# Patient Record
Sex: FEMALE | Race: WHITE | Employment: OTHER | URBAN - NONMETROPOLITAN AREA
[De-identification: names, ages, dates, MRNs, and addresses within clinical notes are randomized per-mention and may not be internally consistent; named-entity substitution may affect disease eponyms.]

---

## 2017-12-19 ENCOUNTER — OFFICE VISIT (OUTPATIENT)
Dept: SURGERY | Age: 77
End: 2017-12-19
Payer: MEDICARE

## 2017-12-19 VITALS
WEIGHT: 146 LBS | SYSTOLIC BLOOD PRESSURE: 124 MMHG | HEART RATE: 50 BPM | DIASTOLIC BLOOD PRESSURE: 72 MMHG | BODY MASS INDEX: 23.46 KG/M2 | HEIGHT: 66 IN

## 2017-12-19 DIAGNOSIS — N63.21 MASS OF UPPER OUTER QUADRANT OF LEFT BREAST: Primary | ICD-10-CM

## 2017-12-19 PROCEDURE — G8400 PT W/DXA NO RESULTS DOC: HCPCS | Performed by: PHYSICIAN ASSISTANT

## 2017-12-19 PROCEDURE — G8427 DOCREV CUR MEDS BY ELIG CLIN: HCPCS | Performed by: PHYSICIAN ASSISTANT

## 2017-12-19 PROCEDURE — 99202 OFFICE O/P NEW SF 15 MIN: CPT | Performed by: PHYSICIAN ASSISTANT

## 2017-12-19 PROCEDURE — 4040F PNEUMOC VAC/ADMIN/RCVD: CPT | Performed by: PHYSICIAN ASSISTANT

## 2017-12-19 PROCEDURE — 1123F ACP DISCUSS/DSCN MKR DOCD: CPT | Performed by: PHYSICIAN ASSISTANT

## 2017-12-19 PROCEDURE — G8420 CALC BMI NORM PARAMETERS: HCPCS | Performed by: PHYSICIAN ASSISTANT

## 2017-12-19 PROCEDURE — G8484 FLU IMMUNIZE NO ADMIN: HCPCS | Performed by: PHYSICIAN ASSISTANT

## 2017-12-19 PROCEDURE — 1036F TOBACCO NON-USER: CPT | Performed by: PHYSICIAN ASSISTANT

## 2017-12-19 PROCEDURE — 1090F PRES/ABSN URINE INCON ASSESS: CPT | Performed by: PHYSICIAN ASSISTANT

## 2017-12-19 RX ORDER — PANTOPRAZOLE SODIUM 40 MG/1
40 TABLET, DELAYED RELEASE ORAL DAILY
COMMUNITY
Start: 2017-12-01

## 2017-12-19 RX ORDER — ATORVASTATIN CALCIUM 80 MG/1
80 TABLET, FILM COATED ORAL DAILY
COMMUNITY
Start: 2017-12-01

## 2017-12-19 RX ORDER — POTASSIUM CHLORIDE 750 MG/1
10 TABLET, FILM COATED, EXTENDED RELEASE ORAL DAILY
COMMUNITY
Start: 2017-12-01

## 2017-12-19 RX ORDER — SPIRONOLACTONE AND HYDROCHLOROTHIAZIDE 25; 25 MG/1; MG/1
1 TABLET, FILM COATED ORAL DAILY
COMMUNITY
Start: 2017-12-01

## 2017-12-19 RX ORDER — ASPIRIN 81 MG/1
81 TABLET ORAL DAILY
COMMUNITY
Start: 2017-12-01

## 2017-12-22 NOTE — PROGRESS NOTES
Subjective:      Patient ID: Martha Cruz is a 69 y.o. female. HPI  Ms. Clint Billy presents with an abnormal left mammogram demonstrating a small spiculated mass. US was normal.   This was her first mammogram in 5 years. Martha Cruz is a 69 y.o. female with the following history as recorded in EpicCare: There are no active problems to display for this patient. Current Outpatient Prescriptions   Medication Sig Dispense Refill    ASPIR-LOW 81 MG EC tablet       ALDACTAZIDE 25-25 MG per tablet       atorvastatin (LIPITOR) 80 MG tablet       metoprolol tartrate (LOPRESSOR) 25 MG tablet       pantoprazole (PROTONIX) 40 MG tablet       calcium carbonate-vitamin D (CALTRATE) 600-400 MG-UNIT TABS per tab       KLOR-CON 10 10 MEQ extended release tablet        No current facility-administered medications for this visit. Allergies: Review of patient's allergies indicates no known allergies. Past Medical History:   Diagnosis Date    Stroke [facility])      Past Surgical History:   Procedure Laterality Date     SECTION      COLONOSCOPY      CYST REMOVAL Left     Left breast      Family History   Problem Relation Age of Onset    Lung Cancer Father      Social History   Substance Use Topics    Smoking status: Passive Smoke Exposure - Never Smoker    Smokeless tobacco: Never Used    Alcohol use No       Review of Systems   All other systems reviewed and are negative. Objective:   Physical Exam   Constitutional: She is oriented to person, place, and time. She appears well-developed and well-nourished. No distress. HENT:   Head: Normocephalic and atraumatic. Right Ear: External ear normal.   Left Ear: External ear normal.   Eyes: Conjunctivae and EOM are normal. Pupils are equal, round, and reactive to light. Pulmonary/Chest: Right breast exhibits no inverted nipple, no mass, no nipple discharge, no skin change and no tenderness.  Left breast exhibits no inverted nipple, no mass, no nipple discharge, no skin change and no tenderness. Breasts are symmetrical.   Neurological: She is alert and oriented to person, place, and time. No cranial nerve deficit. Skin: Skin is warm and dry. No rash noted. She is not diaphoretic. No erythema. No pallor. Psychiatric: She has a normal mood and affect. Her behavior is normal. Judgment and thought content normal.       Assessment:      Left breast mass      Plan:      PLAN:  This will require stereotactic guided mammotome biopsy, which will be done in the hospital under local anesthesia. Further procedures will be done as indicated. CONSENT:  The risks, benefits and options of stereotactic biopsy were discussed with her including but not limited to bleeding, infection, hematoma, missing the lesion, and scarring. She expresses good understanding and is agreeable to proceed.

## 2018-01-03 ENCOUNTER — HOSPITAL ENCOUNTER (OUTPATIENT)
Dept: WOMENS IMAGING | Age: 78
Discharge: HOME | End: 2018-01-03
Payer: MEDICARE

## 2018-01-03 DIAGNOSIS — N63.20 LEFT BREAST MASS: ICD-10-CM

## 2018-01-03 DIAGNOSIS — N63.21 MASS OF UPPER OUTER QUADRANT OF LEFT BREAST: ICD-10-CM

## 2018-01-03 PROCEDURE — 88341 IMHCHEM/IMCYTCHM EA ADD ANTB: CPT

## 2018-01-03 PROCEDURE — 2720000001 US BREAST NEEDLE BIOPSY LEFT

## 2018-01-03 PROCEDURE — 88342 IMHCHEM/IMCYTCHM 1ST ANTB: CPT

## 2018-01-03 PROCEDURE — 88374 M/PHMTRC ALYS ISHQUANT/SEMIQ: CPT

## 2018-01-03 PROCEDURE — 88361 TUMOR IMMUNOHISTOCHEM/COMPUT: CPT

## 2018-01-03 PROCEDURE — 88360 TUMOR IMMUNOHISTOCHEM/MANUAL: CPT

## 2018-01-03 PROCEDURE — 2720000001 US BREAST BIOPSY NEEDLE ADDITIONAL LEFT

## 2018-01-03 PROCEDURE — 88305 TISSUE EXAM BY PATHOLOGIST: CPT

## 2018-01-03 PROCEDURE — 77065 DX MAMMO INCL CAD UNI: CPT

## 2018-01-03 PROCEDURE — 76642 ULTRASOUND BREAST LIMITED: CPT

## 2018-01-05 ENCOUNTER — LAB REQUISITION (OUTPATIENT)
Dept: LAB | Facility: HOSPITAL | Age: 78
End: 2018-01-05

## 2018-01-05 DIAGNOSIS — Z00.00 ROUTINE GENERAL MEDICAL EXAMINATION AT A HEALTH CARE FACILITY: ICD-10-CM

## 2018-01-05 PROCEDURE — 88341 IMHCHEM/IMCYTCHM EA ADD ANTB: CPT

## 2018-01-05 PROCEDURE — 88342 IMHCHEM/IMCYTCHM 1ST ANTB: CPT

## 2018-01-08 ENCOUNTER — TELEPHONE (OUTPATIENT)
Dept: SURGERY | Age: 78
End: 2018-01-08

## 2018-01-08 DIAGNOSIS — C50.112 MALIGNANT NEOPLASM OF CENTRAL PORTION OF LEFT FEMALE BREAST, UNSPECIFIED ESTROGEN RECEPTOR STATUS (HCC): Primary | ICD-10-CM

## 2018-01-09 PROBLEM — C50.112 MALIGNANT NEOPLASM OF CENTRAL PORTION OF LEFT FEMALE BREAST (HCC): Status: ACTIVE | Noted: 2018-01-09

## 2018-01-09 RX ORDER — DIAZEPAM 5 MG/1
TABLET ORAL
Qty: 4 TABLET | Refills: 0 | OUTPATIENT
Start: 2018-01-09 | End: 2018-01-30

## 2018-01-10 DIAGNOSIS — C50.112 MALIGNANT NEOPLASM OF CENTRAL PORTION OF LEFT FEMALE BREAST, UNSPECIFIED ESTROGEN RECEPTOR STATUS (HCC): Primary | ICD-10-CM

## 2018-01-10 NOTE — TELEPHONE ENCOUNTER
[facility]  [facility]   [email or web address] on 1/18/2018@12 Pm  Left Breast [email or web address] on 1/24@3 and then she will have her consult [email or web address] Pm. All instructions for test were given.      Appt W/Elaine has been canceled

## 2018-01-18 ENCOUNTER — HOSPITAL ENCOUNTER (OUTPATIENT)
Dept: MRI IMAGING | Age: 78
Discharge: HOME | End: 2018-01-18
Payer: MEDICARE

## 2018-01-18 DIAGNOSIS — C50.112 MALIGNANT NEOPLASM OF CENTRAL PORTION OF LEFT FEMALE BREAST, UNSPECIFIED ESTROGEN RECEPTOR STATUS (HCC): ICD-10-CM

## 2018-01-18 LAB
GFR NON-AFRICAN AMERICAN: >60
PERFORMED ON: NORMAL
POC CREATININE: 0.8 MG/DL (ref 0.3–1.3)
POC SAMPLE TYPE: NORMAL

## 2018-01-18 PROCEDURE — C8908 MRI W/O FOL W/CONT, BREAST,: HCPCS

## 2018-01-18 PROCEDURE — A9577 INJ MULTIHANCE: HCPCS | Performed by: PHYSICIAN ASSISTANT

## 2018-01-18 PROCEDURE — 6360000004 HC RX CONTRAST MEDICATION: Performed by: PHYSICIAN ASSISTANT

## 2018-01-18 PROCEDURE — 82565 ASSAY OF CREATININE: CPT

## 2018-01-18 RX ADMIN — GADOBENATE DIMEGLUMINE 13 ML: 529 INJECTION, SOLUTION INTRAVENOUS at 13:05

## 2018-01-24 ENCOUNTER — HOSPITAL ENCOUNTER (OUTPATIENT)
Dept: WOMENS IMAGING | Age: 78
Discharge: HOME | End: 2018-01-24
Payer: MEDICARE

## 2018-01-24 ENCOUNTER — INITIAL CONSULT (OUTPATIENT)
Dept: SURGERY | Age: 78
End: 2018-01-24
Payer: MEDICARE

## 2018-01-24 DIAGNOSIS — C50.112 MALIGNANT NEOPLASM OF CENTRAL PORTION OF LEFT BREAST IN FEMALE, ESTROGEN RECEPTOR POSITIVE (HCC): Primary | ICD-10-CM

## 2018-01-24 DIAGNOSIS — C50.112 MALIGNANT NEOPLASM OF CENTRAL PORTION OF LEFT FEMALE BREAST, UNSPECIFIED ESTROGEN RECEPTOR STATUS (HCC): ICD-10-CM

## 2018-01-24 DIAGNOSIS — Z17.0 MALIGNANT NEOPLASM OF CENTRAL PORTION OF LEFT BREAST IN FEMALE, ESTROGEN RECEPTOR POSITIVE (HCC): Primary | ICD-10-CM

## 2018-01-24 PROCEDURE — G8428 CUR MEDS NOT DOCUMENT: HCPCS | Performed by: SURGERY

## 2018-01-24 PROCEDURE — 1090F PRES/ABSN URINE INCON ASSESS: CPT | Performed by: SURGERY

## 2018-01-24 PROCEDURE — G8420 CALC BMI NORM PARAMETERS: HCPCS | Performed by: SURGERY

## 2018-01-24 PROCEDURE — 1036F TOBACCO NON-USER: CPT | Performed by: SURGERY

## 2018-01-24 PROCEDURE — G8400 PT W/DXA NO RESULTS DOC: HCPCS | Performed by: SURGERY

## 2018-01-24 PROCEDURE — G8484 FLU IMMUNIZE NO ADMIN: HCPCS | Performed by: SURGERY

## 2018-01-24 PROCEDURE — 1123F ACP DISCUSS/DSCN MKR DOCD: CPT | Performed by: SURGERY

## 2018-01-24 PROCEDURE — 4040F PNEUMOC VAC/ADMIN/RCVD: CPT | Performed by: SURGERY

## 2018-01-24 PROCEDURE — 99215 OFFICE O/P EST HI 40 MIN: CPT | Performed by: SURGERY

## 2018-01-30 ENCOUNTER — HOSPITAL ENCOUNTER (OUTPATIENT)
Dept: PREADMISSION TESTING | Age: 78
Discharge: HOME | End: 2018-02-03
Payer: MEDICARE

## 2018-01-30 VITALS — BODY MASS INDEX: 23.3 KG/M2 | HEIGHT: 66 IN | WEIGHT: 145 LBS

## 2018-01-30 LAB
ANION GAP SERPL CALCULATED.3IONS-SCNC: 15 MMOL/L (ref 7–19)
BASOPHILS ABSOLUTE: 0.1 K/UL (ref 0–0.2)
BASOPHILS RELATIVE PERCENT: 0.6 % (ref 0–1)
BUN BLDV-MCNC: 18 MG/DL (ref 8–23)
CALCIUM SERPL-MCNC: 9.3 MG/DL (ref 8.8–10.2)
CHLORIDE BLD-SCNC: 98 MMOL/L (ref 98–111)
CO2: 28 MMOL/L (ref 22–29)
CREAT SERPL-MCNC: 0.8 MG/DL (ref 0.5–0.9)
EOSINOPHILS ABSOLUTE: 0.1 K/UL (ref 0–0.6)
EOSINOPHILS RELATIVE PERCENT: 1.6 % (ref 0–5)
GFR NON-AFRICAN AMERICAN: >60
GLUCOSE BLD-MCNC: 88 MG/DL (ref 74–109)
HCT VFR BLD CALC: 42.1 % (ref 37–47)
HEMOGLOBIN: 13.7 G/DL (ref 12–16)
LYMPHOCYTES ABSOLUTE: 3.4 K/UL (ref 1.1–4.5)
LYMPHOCYTES RELATIVE PERCENT: 40.5 % (ref 20–40)
MCH RBC QN AUTO: 27.6 PG (ref 27–31)
MCHC RBC AUTO-ENTMCNC: 32.5 G/DL (ref 33–37)
MCV RBC AUTO: 84.9 FL (ref 81–99)
MONOCYTES ABSOLUTE: 0.5 K/UL (ref 0–0.9)
MONOCYTES RELATIVE PERCENT: 5.4 % (ref 0–10)
NEUTROPHILS ABSOLUTE: 4.4 K/UL (ref 1.5–7.5)
NEUTROPHILS RELATIVE PERCENT: 51.5 % (ref 50–65)
PDW BLD-RTO: 12.4 % (ref 11.5–14.5)
PLATELET # BLD: 209 K/UL (ref 130–400)
PMV BLD AUTO: 11.9 FL (ref 9.4–12.3)
POTASSIUM SERPL-SCNC: 3.8 MMOL/L (ref 3.5–5)
RBC # BLD: 4.96 M/UL (ref 4.2–5.4)
SODIUM BLD-SCNC: 141 MMOL/L (ref 136–145)
WBC # BLD: 8.5 K/UL (ref 4.8–10.8)

## 2018-01-30 PROCEDURE — 80048 BASIC METABOLIC PNL TOTAL CA: CPT

## 2018-01-30 PROCEDURE — 85025 COMPLETE CBC W/AUTO DIFF WBC: CPT

## 2018-01-30 RX ORDER — METOPROLOL SUCCINATE 25 MG/1
25 TABLET, EXTENDED RELEASE ORAL DAILY
COMMUNITY

## 2018-02-05 ENCOUNTER — HOSPITAL ENCOUNTER (OUTPATIENT)
Dept: WOMENS IMAGING | Age: 78
Discharge: HOME | End: 2018-02-05
Payer: MEDICARE

## 2018-02-05 DIAGNOSIS — C50.912 INVASIVE DUCTAL CARCINOMA OF BREAST, FEMALE, LEFT (HCC): ICD-10-CM

## 2018-02-05 PROCEDURE — 76642 ULTRASOUND BREAST LIMITED: CPT

## 2018-02-06 ENCOUNTER — HOSPITAL ENCOUNTER (OUTPATIENT)
Age: 78
Setting detail: OUTPATIENT SURGERY
Discharge: HOME | End: 2018-02-06
Attending: SURGERY | Admitting: SURGERY
Payer: MEDICARE

## 2018-02-06 ENCOUNTER — HOSPITAL ENCOUNTER (OUTPATIENT)
Dept: ULTRASOUND IMAGING | Age: 78
Discharge: HOME | End: 2018-02-06
Payer: MEDICARE

## 2018-02-06 ENCOUNTER — ANESTHESIA EVENT (OUTPATIENT)
Dept: OPERATING ROOM | Age: 78
End: 2018-02-06
Payer: MEDICARE

## 2018-02-06 ENCOUNTER — ANESTHESIA (OUTPATIENT)
Dept: OPERATING ROOM | Age: 78
End: 2018-02-06
Payer: MEDICARE

## 2018-02-06 ENCOUNTER — HOSPITAL ENCOUNTER (OUTPATIENT)
Dept: NUCLEAR MEDICINE | Age: 78
Discharge: HOME | End: 2018-02-08
Payer: MEDICARE

## 2018-02-06 VITALS
TEMPERATURE: 98.1 F | RESPIRATION RATE: 14 BRPM | BODY MASS INDEX: 23.3 KG/M2 | HEART RATE: 48 BPM | DIASTOLIC BLOOD PRESSURE: 54 MMHG | WEIGHT: 145 LBS | OXYGEN SATURATION: 97 % | HEIGHT: 66 IN | SYSTOLIC BLOOD PRESSURE: 126 MMHG

## 2018-02-06 VITALS
OXYGEN SATURATION: 96 % | TEMPERATURE: 98 F | RESPIRATION RATE: 11 BRPM | DIASTOLIC BLOOD PRESSURE: 64 MMHG | SYSTOLIC BLOOD PRESSURE: 82 MMHG

## 2018-02-06 DIAGNOSIS — C50.112 MALIGNANT NEOPLASM OF CENTRAL PORTION OF LEFT FEMALE BREAST, UNSPECIFIED ESTROGEN RECEPTOR STATUS (HCC): ICD-10-CM

## 2018-02-06 PROBLEM — Z17.0 MALIGNANT NEOPLASM OF CENTRAL PORTION OF LEFT BREAST IN FEMALE, ESTROGEN RECEPTOR POSITIVE (HCC): Status: ACTIVE | Noted: 2018-01-09

## 2018-02-06 PROCEDURE — 2720000000 US PLACE BREAST LOC DEVICE 1ST LESION LEFT

## 2018-02-06 PROCEDURE — 19366 PR BREAST RECONSTRUC W OTHR TECHNIQ: CPT | Performed by: PHYSICIAN ASSISTANT

## 2018-02-06 PROCEDURE — A4648 IMPLANTABLE TISSUE MARKER: HCPCS | Performed by: SURGERY

## 2018-02-06 PROCEDURE — 7100000011 HC PHASE II RECOVERY - ADDTL 15 MIN: Performed by: SURGERY

## 2018-02-06 PROCEDURE — 19301 PARTIAL MASTECTOMY: CPT | Performed by: SURGERY

## 2018-02-06 PROCEDURE — 7100000000 HC PACU RECOVERY - FIRST 15 MIN: Performed by: SURGERY

## 2018-02-06 PROCEDURE — 2500000003 HC RX 250 WO HCPCS: Performed by: SURGERY

## 2018-02-06 PROCEDURE — A6403 STERILE GAUZE>16 <= 48 SQ IN: HCPCS | Performed by: SURGERY

## 2018-02-06 PROCEDURE — 7100000001 HC PACU RECOVERY - ADDTL 15 MIN: Performed by: SURGERY

## 2018-02-06 PROCEDURE — 19499 UNLISTED PROCEDURE BREAST: CPT | Performed by: SURGERY

## 2018-02-06 PROCEDURE — C1729 CATH, DRAINAGE: HCPCS | Performed by: SURGERY

## 2018-02-06 PROCEDURE — 19366 PR BREAST RECONSTRUC W OTHR TECHNIQ: CPT | Performed by: SURGERY

## 2018-02-06 PROCEDURE — 7100000010 HC PHASE II RECOVERY - FIRST 15 MIN: Performed by: SURGERY

## 2018-02-06 PROCEDURE — 3600000005 HC SURGERY LEVEL 5 BASE: Performed by: SURGERY

## 2018-02-06 PROCEDURE — 6360000002 HC RX W HCPCS: Performed by: NURSE PRACTITIONER

## 2018-02-06 PROCEDURE — 38900 IO MAP OF SENT LYMPH NODE: CPT | Performed by: SURGERY

## 2018-02-06 PROCEDURE — 3600000015 HC SURGERY LEVEL 5 ADDTL 15MIN: Performed by: SURGERY

## 2018-02-06 PROCEDURE — 3700000001 HC ADD 15 MINUTES (ANESTHESIA): Performed by: SURGERY

## 2018-02-06 PROCEDURE — 88307 TISSUE EXAM BY PATHOLOGIST: CPT

## 2018-02-06 PROCEDURE — 6360000002 HC RX W HCPCS: Performed by: SURGERY

## 2018-02-06 PROCEDURE — 38525 BIOPSY/REMOVAL LYMPH NODES: CPT | Performed by: PHYSICIAN ASSISTANT

## 2018-02-06 PROCEDURE — 19301 PARTIAL MASTECTOMY: CPT | Performed by: PHYSICIAN ASSISTANT

## 2018-02-06 PROCEDURE — 2580000003 HC RX 258: Performed by: NURSE PRACTITIONER

## 2018-02-06 PROCEDURE — 2500000003 HC RX 250 WO HCPCS: Performed by: NURSE PRACTITIONER

## 2018-02-06 PROCEDURE — 2580000003 HC RX 258: Performed by: SURGERY

## 2018-02-06 PROCEDURE — 3430000000 HC RX DIAGNOSTIC RADIOPHARMACEUTICAL: Performed by: SURGERY

## 2018-02-06 PROCEDURE — A9520 TC99 TILMANOCEPT DIAG 0.5MCI: HCPCS | Performed by: SURGERY

## 2018-02-06 PROCEDURE — 2720000001 HC MISC SURG SUPPLY STERILE $51-500: Performed by: SURGERY

## 2018-02-06 PROCEDURE — 38525 BIOPSY/REMOVAL LYMPH NODES: CPT | Performed by: SURGERY

## 2018-02-06 PROCEDURE — 19285 PERQ DEV BREAST 1ST US IMAG: CPT | Performed by: SURGERY

## 2018-02-06 PROCEDURE — 3700000000 HC ANESTHESIA ATTENDED CARE: Performed by: SURGERY

## 2018-02-06 PROCEDURE — 38792 RA TRACER ID OF SENTINL NODE: CPT

## 2018-02-06 DEVICE — THE MARKER IS A RADIOGRAPHIC IMPLANTABLE MARKER USED TO MARK SOFT TISSUE.IT IS COMPRISED OF A BIOABSORBABLE SPACER THAT HOLDS RADIOPAQUE MARKER CLIPS.
Type: IMPLANTABLE DEVICE | Site: BREAST | Status: FUNCTIONAL
Brand: BIOZORB MARKER

## 2018-02-06 RX ORDER — MIDAZOLAM HYDROCHLORIDE 1 MG/ML
INJECTION INTRAMUSCULAR; INTRAVENOUS PRN
Status: DISCONTINUED | OUTPATIENT
Start: 2018-02-06 | End: 2018-02-06 | Stop reason: SDUPTHER

## 2018-02-06 RX ORDER — SODIUM CHLORIDE, SODIUM LACTATE, POTASSIUM CHLORIDE, CALCIUM CHLORIDE 600; 310; 30; 20 MG/100ML; MG/100ML; MG/100ML; MG/100ML
INJECTION, SOLUTION INTRAVENOUS CONTINUOUS
Status: DISCONTINUED | OUTPATIENT
Start: 2018-02-06 | End: 2018-02-06 | Stop reason: HOSPADM

## 2018-02-06 RX ORDER — SODIUM CHLORIDE 0.9 % (FLUSH) 0.9 %
10 SYRINGE (ML) INJECTION EVERY 12 HOURS SCHEDULED
Status: DISCONTINUED | OUTPATIENT
Start: 2018-02-06 | End: 2018-02-06 | Stop reason: HOSPADM

## 2018-02-06 RX ORDER — FENTANYL CITRATE 50 UG/ML
50 INJECTION, SOLUTION INTRAMUSCULAR; INTRAVENOUS
Status: DISCONTINUED | OUTPATIENT
Start: 2018-02-06 | End: 2018-02-06 | Stop reason: HOSPADM

## 2018-02-06 RX ORDER — GLYCOPYRROLATE 0.2 MG/ML
INJECTION INTRAMUSCULAR; INTRAVENOUS PRN
Status: DISCONTINUED | OUTPATIENT
Start: 2018-02-06 | End: 2018-02-06 | Stop reason: SDUPTHER

## 2018-02-06 RX ORDER — PROMETHAZINE HYDROCHLORIDE 25 MG/ML
6.25 INJECTION, SOLUTION INTRAMUSCULAR; INTRAVENOUS
Status: DISCONTINUED | OUTPATIENT
Start: 2018-02-06 | End: 2018-02-06 | Stop reason: HOSPADM

## 2018-02-06 RX ORDER — KETOROLAC TROMETHAMINE 30 MG/ML
INJECTION, SOLUTION INTRAMUSCULAR; INTRAVENOUS PRN
Status: DISCONTINUED | OUTPATIENT
Start: 2018-02-06 | End: 2018-02-06 | Stop reason: SDUPTHER

## 2018-02-06 RX ORDER — LIDOCAINE HYDROCHLORIDE 10 MG/ML
INJECTION, SOLUTION INFILTRATION; PERINEURAL PRN
Status: DISCONTINUED | OUTPATIENT
Start: 2018-02-06 | End: 2018-02-06 | Stop reason: SDUPTHER

## 2018-02-06 RX ORDER — HYDRALAZINE HYDROCHLORIDE 20 MG/ML
5 INJECTION INTRAMUSCULAR; INTRAVENOUS EVERY 10 MIN PRN
Status: DISCONTINUED | OUTPATIENT
Start: 2018-02-06 | End: 2018-02-06 | Stop reason: HOSPADM

## 2018-02-06 RX ORDER — ISOSULFAN BLUE 50 MG/5ML
INJECTION, SOLUTION SUBCUTANEOUS PRN
Status: DISCONTINUED | OUTPATIENT
Start: 2018-02-06 | End: 2018-02-06 | Stop reason: HOSPADM

## 2018-02-06 RX ORDER — ONDANSETRON 2 MG/ML
INJECTION INTRAMUSCULAR; INTRAVENOUS PRN
Status: DISCONTINUED | OUTPATIENT
Start: 2018-02-06 | End: 2018-02-06 | Stop reason: SDUPTHER

## 2018-02-06 RX ORDER — LABETALOL HYDROCHLORIDE 5 MG/ML
5 INJECTION, SOLUTION INTRAVENOUS EVERY 10 MIN PRN
Status: DISCONTINUED | OUTPATIENT
Start: 2018-02-06 | End: 2018-02-06 | Stop reason: HOSPADM

## 2018-02-06 RX ORDER — DEXAMETHASONE SODIUM PHOSPHATE 10 MG/ML
INJECTION INTRAMUSCULAR; INTRAVENOUS PRN
Status: DISCONTINUED | OUTPATIENT
Start: 2018-02-06 | End: 2018-02-06 | Stop reason: SDUPTHER

## 2018-02-06 RX ORDER — HYDROCODONE BITARTRATE AND ACETAMINOPHEN 5; 325 MG/1; MG/1
TABLET ORAL
Qty: 30 TABLET | Refills: 0 | Status: SHIPPED | OUTPATIENT
Start: 2018-02-06 | End: 2018-02-21

## 2018-02-06 RX ORDER — SODIUM CHLORIDE 0.9 % (FLUSH) 0.9 %
10 SYRINGE (ML) INJECTION PRN
Status: DISCONTINUED | OUTPATIENT
Start: 2018-02-06 | End: 2018-02-06 | Stop reason: HOSPADM

## 2018-02-06 RX ORDER — FENTANYL CITRATE 50 UG/ML
25 INJECTION, SOLUTION INTRAMUSCULAR; INTRAVENOUS
Status: DISCONTINUED | OUTPATIENT
Start: 2018-02-06 | End: 2018-02-06 | Stop reason: HOSPADM

## 2018-02-06 RX ORDER — MIDAZOLAM HYDROCHLORIDE 1 MG/ML
2 INJECTION INTRAMUSCULAR; INTRAVENOUS
Status: DISCONTINUED | OUTPATIENT
Start: 2018-02-06 | End: 2018-02-06 | Stop reason: HOSPADM

## 2018-02-06 RX ORDER — SODIUM CHLORIDE 9 MG/ML
INJECTION, SOLUTION INTRAVENOUS CONTINUOUS
Status: DISCONTINUED | OUTPATIENT
Start: 2018-02-06 | End: 2018-02-06 | Stop reason: HOSPADM

## 2018-02-06 RX ORDER — FENTANYL CITRATE 50 UG/ML
INJECTION, SOLUTION INTRAMUSCULAR; INTRAVENOUS PRN
Status: DISCONTINUED | OUTPATIENT
Start: 2018-02-06 | End: 2018-02-06 | Stop reason: SDUPTHER

## 2018-02-06 RX ORDER — LIDOCAINE HYDROCHLORIDE 10 MG/ML
1 INJECTION, SOLUTION EPIDURAL; INFILTRATION; INTRACAUDAL; PERINEURAL
Status: DISCONTINUED | OUTPATIENT
Start: 2018-02-06 | End: 2018-02-06 | Stop reason: HOSPADM

## 2018-02-06 RX ORDER — HYDROMORPHONE HCL 110MG/55ML
0.5 PATIENT CONTROLLED ANALGESIA SYRINGE INTRAVENOUS EVERY 5 MIN PRN
Status: DISCONTINUED | OUTPATIENT
Start: 2018-02-06 | End: 2018-02-06 | Stop reason: HOSPADM

## 2018-02-06 RX ORDER — SODIUM CHLORIDE, SODIUM LACTATE, POTASSIUM CHLORIDE, CALCIUM CHLORIDE 600; 310; 30; 20 MG/100ML; MG/100ML; MG/100ML; MG/100ML
INJECTION, SOLUTION INTRAVENOUS CONTINUOUS PRN
Status: DISCONTINUED | OUTPATIENT
Start: 2018-02-06 | End: 2018-02-06 | Stop reason: SDUPTHER

## 2018-02-06 RX ORDER — MORPHINE SULFATE 4 MG/ML
4 INJECTION, SOLUTION INTRAMUSCULAR; INTRAVENOUS EVERY 5 MIN PRN
Status: DISCONTINUED | OUTPATIENT
Start: 2018-02-06 | End: 2018-02-06 | Stop reason: HOSPADM

## 2018-02-06 RX ORDER — HYDROMORPHONE HCL 110MG/55ML
0.25 PATIENT CONTROLLED ANALGESIA SYRINGE INTRAVENOUS EVERY 5 MIN PRN
Status: DISCONTINUED | OUTPATIENT
Start: 2018-02-06 | End: 2018-02-06 | Stop reason: HOSPADM

## 2018-02-06 RX ORDER — MEPERIDINE HYDROCHLORIDE 50 MG/ML
12.5 INJECTION INTRAMUSCULAR; INTRAVENOUS; SUBCUTANEOUS EVERY 5 MIN PRN
Status: DISCONTINUED | OUTPATIENT
Start: 2018-02-06 | End: 2018-02-06 | Stop reason: HOSPADM

## 2018-02-06 RX ORDER — PROPOFOL 10 MG/ML
INJECTION, EMULSION INTRAVENOUS PRN
Status: DISCONTINUED | OUTPATIENT
Start: 2018-02-06 | End: 2018-02-06 | Stop reason: SDUPTHER

## 2018-02-06 RX ORDER — ENALAPRILAT 2.5 MG/2ML
1.25 INJECTION INTRAVENOUS
Status: DISCONTINUED | OUTPATIENT
Start: 2018-02-06 | End: 2018-02-06 | Stop reason: HOSPADM

## 2018-02-06 RX ORDER — MORPHINE SULFATE 4 MG/ML
2 INJECTION, SOLUTION INTRAMUSCULAR; INTRAVENOUS EVERY 5 MIN PRN
Status: DISCONTINUED | OUTPATIENT
Start: 2018-02-06 | End: 2018-02-06 | Stop reason: HOSPADM

## 2018-02-06 RX ORDER — METOCLOPRAMIDE HYDROCHLORIDE 5 MG/ML
10 INJECTION INTRAMUSCULAR; INTRAVENOUS
Status: DISCONTINUED | OUTPATIENT
Start: 2018-02-06 | End: 2018-02-06 | Stop reason: HOSPADM

## 2018-02-06 RX ORDER — DIPHENHYDRAMINE HYDROCHLORIDE 50 MG/ML
12.5 INJECTION INTRAMUSCULAR; INTRAVENOUS
Status: DISCONTINUED | OUTPATIENT
Start: 2018-02-06 | End: 2018-02-06 | Stop reason: HOSPADM

## 2018-02-06 RX ADMIN — LIDOCAINE HYDROCHLORIDE 50 MG: 10 INJECTION, SOLUTION INFILTRATION; PERINEURAL at 12:18

## 2018-02-06 RX ADMIN — SODIUM CHLORIDE, SODIUM LACTATE, POTASSIUM CHLORIDE, AND CALCIUM CHLORIDE: 600; 310; 30; 20 INJECTION, SOLUTION INTRAVENOUS at 12:15

## 2018-02-06 RX ADMIN — MIDAZOLAM HYDROCHLORIDE 2 MG: 1 INJECTION, SOLUTION INTRAMUSCULAR; INTRAVENOUS at 12:15

## 2018-02-06 RX ADMIN — FENTANYL CITRATE 50 MCG: 50 INJECTION, SOLUTION INTRAMUSCULAR; INTRAVENOUS at 12:52

## 2018-02-06 RX ADMIN — KETOROLAC TROMETHAMINE 30 MG: 30 INJECTION, SOLUTION INTRAMUSCULAR at 12:31

## 2018-02-06 RX ADMIN — GLYCOPYRROLATE 0.4 MG: 0.2 INJECTION, SOLUTION INTRAMUSCULAR; INTRAVENOUS at 12:22

## 2018-02-06 RX ADMIN — HYDROMORPHONE HYDROCHLORIDE 1 MG: 1 INJECTION, SOLUTION INTRAMUSCULAR; INTRAVENOUS; SUBCUTANEOUS at 13:50

## 2018-02-06 RX ADMIN — TILMANOCEPT 0.5 MILLICURIE: KIT at 13:29

## 2018-02-06 RX ADMIN — DEXAMETHASONE SODIUM PHOSPHATE 10 MG: 10 INJECTION INTRAMUSCULAR; INTRAVENOUS at 12:31

## 2018-02-06 RX ADMIN — TILMANOCEPT 0.5 MILLICURIE: KIT at 13:30

## 2018-02-06 RX ADMIN — PROPOFOL 200 MG: 10 INJECTION, EMULSION INTRAVENOUS at 12:18

## 2018-02-06 RX ADMIN — ONDANSETRON HYDROCHLORIDE 4 MG: 2 SOLUTION INTRAMUSCULAR; INTRAVENOUS at 13:38

## 2018-02-06 RX ADMIN — CEFAZOLIN 1 G: 1 INJECTION, POWDER, FOR SOLUTION INTRAMUSCULAR; INTRAVENOUS; PARENTERAL at 12:15

## 2018-02-06 ASSESSMENT — ENCOUNTER SYMPTOMS: SHORTNESS OF BREATH: 1

## 2018-02-06 ASSESSMENT — LIFESTYLE VARIABLES: SMOKING_STATUS: 0

## 2018-02-06 NOTE — H&P
node dissection. We explained in depth why breast conservation therapy requires radiation treatments for the majority of women. These treatments may be external beam for 6 weeks, partial breast for 5 days,  or intraoperative. I explained that most women treated for invasive malignancy do receive systemic therapy, hormonal therapy or  chemotherapy postoperatively depending upon the final pathology, the lymph node status, and the hormone receptor status. I discussed Oncotype Dx, Mammoprint, and Adjuvant Online as tools which aid in the decision for chemotherapy or hormonal therapy. We also discussed the possibility of breast reconstruction if a mastectomy was required. .  I explained to her the different techniques including placement of a subpectoral implant with a Alloderm sling  versus TRAM flap reconstruction as welll as other methods of reconstruction. She does not wish to pursue reconstruction at this time.         After a prolonged discussion lasting 120 minutes  we felt it was most appropriate that she undergo left lumpectomy, and sentinel node biopsy, with preop ultrasound and and intraop ultrasound guided needle localization          We discussed the risks and benefits of the surgery including but not limited to bleeding, infection, pain, lymphedema, numbness, and also the risks of general anesthesia including pneumonia, blood clots, heart attack, stroke, and death.   She expresses good understanding and is agreeable to proceed with surgery.       We will schedule this to be done when convenient  at [facility].

## 2018-02-06 NOTE — ANESTHESIA POSTPROCEDURE EVALUATION
Department of Anesthesiology  Postprocedure Note    Patient: Alan Estrella  MRN: [id number]  YOB: 1940  Date of evaluation: 2/6/2018  Time:  2:25 PM     Procedure Summary     Date:  02/06/18 Room / Location:  [facility] OR  / [facility] OR    Anesthesia Start:   Anesthesia Stop:      Procedure:  LUMPECTOMY WITH SNB WITH PREOP US AND INTRAOP US GUIDED NEEDLE LOCALIZATION X2 (Left Breast) Diagnosis:  (L29.424)    Surgeon:  Gail Calvo MD Responsible Provider:      Anesthesia Type:  general ASA Status:  3          Anesthesia Type: No value filed. Lemuel Phase I:      Lemuel Phase II:      Last vitals: Reviewed and per EMR flowsheets.        Anesthesia Post Evaluation    Patient location during evaluation: PACU  Patient participation: complete - patient participated  Level of consciousness: awake and alert  Pain score: 0  Airway patency: patent  Nausea & Vomiting: no vomiting and no nausea  Complications: no  Cardiovascular status: hemodynamically stable  Respiratory status: spontaneous ventilation and nasal cannula  Hydration status: stable

## 2018-02-06 NOTE — ANESTHESIA PRE PROCEDURE
11.56 07/12/2011    INR 1.06 07/12/2011       HCG (If Applicable): No results found for: PREGTESTUR, PREGSERUM, HCG, HCGQUANT     ABGs: No results found for: PHART, PO2ART, ANF1DRZ, ZFO7ZIU, BEART, N5DMSZNT     Type & Screen (If Applicable):  No results found for: LABABO, [address]    Anesthesia Evaluation  Patient summary reviewed and Nursing notes reviewed no history of anesthetic complications:   Airway: Mallampati: I  TM distance: >3 FB   Neck ROM: full  Mouth opening: > = 3 FB Dental: normal exam         Pulmonary:normal exam    (+) shortness of breath: no interval change,      (-) asthma, sleep apnea and not a current smoker                           Cardiovascular:  Exercise tolerance: good (>4 METS),   (+) hypertension:,     (-) pacemaker, past MI, CABG/stent, dysrhythmias and  angina       Beta Blocker:  Dose within 24 Hrs      ROS comment: Negative recent stress test per patient. Neuro/Psych:   (+) CVA: no interval change,    (-) seizures           GI/Hepatic/Renal:   (+) GERD: well controlled,      (-) liver disease and no renal disease       Endo/Other:    (+) blood dyscrasia (stopped ASA 1 week ago)::., malignancy/cancer (breast cancer). (-) hypothyroidism, no Type II DM               Abdominal:           Vascular:     - DVT and PE. Anesthesia Plan      general     ASA 3       Induction: intravenous and inhalational.  BIS  MIPS: Postoperative opioids intended and Prophylactic antiemetics administered. Anesthetic plan and risks discussed with patient.                       Cecilio Barraza DO   2/6/2018

## 2018-02-06 NOTE — BRIEF OP NOTE
Brief Postoperative Note      DATE OF PROCEDURE: 2/6/2018     SURGEON: Guille Canada MD    PREOPERATIVE DIAGNOSIS: C50.112    POSTOPERATIVE DIAGNOSIS: Same     OPERATION: Procedure(s):  LUMPECTOMY WITH SNB WITH PREOP US AND INTRAOP US GUIDED NEEDLE LOCALIZATION X2    ANESTHESIA: General    ESTIMATED BLOOD LOSS: Minimal    COMPLICATIONS: None. SPECIMENS:   ID Type Source Tests Collected by Time Destination   A : LEFT BREAST TISSUE Tissue Breast SURGICAL PATHOLOGY Guille Canada MD 2/6/2018 1248    B : LEFT SENTINEL NODE Tissue Breast SURGICAL PATHOLOGY Guille Canada MD 2/6/2018 1249    C : LEFT BREAST TISSUE- ADDITIONAL NEW MARGINS Tissue Breast SURGICAL PATHOLOGY Guille Canada MD 2/6/2018 1318        DRAINS: [email or web address]    The patient tolerated the procedure well.     Electronically signed by Guille Canada MD  on 2/6/2018 at 2:21 PM

## 2018-02-14 ENCOUNTER — OFFICE VISIT (OUTPATIENT)
Dept: SURGERY | Age: 78
End: 2018-02-14

## 2018-02-14 VITALS
DIASTOLIC BLOOD PRESSURE: 74 MMHG | SYSTOLIC BLOOD PRESSURE: 130 MMHG | BODY MASS INDEX: 23.24 KG/M2 | HEIGHT: 66 IN | TEMPERATURE: 98.2 F | WEIGHT: 144.6 LBS

## 2018-02-14 DIAGNOSIS — Z17.0 MALIGNANT NEOPLASM OF CENTRAL PORTION OF LEFT BREAST IN FEMALE, ESTROGEN RECEPTOR POSITIVE (HCC): Primary | ICD-10-CM

## 2018-02-14 DIAGNOSIS — C50.112 MALIGNANT NEOPLASM OF CENTRAL PORTION OF LEFT BREAST IN FEMALE, ESTROGEN RECEPTOR POSITIVE (HCC): Primary | ICD-10-CM

## 2018-02-14 PROCEDURE — 99024 POSTOP FOLLOW-UP VISIT: CPT | Performed by: SURGERY

## 2018-02-21 ENCOUNTER — OFFICE VISIT (OUTPATIENT)
Dept: SURGERY | Age: 78
End: 2018-02-21

## 2018-02-21 VITALS
HEIGHT: 66 IN | SYSTOLIC BLOOD PRESSURE: 134 MMHG | BODY MASS INDEX: 23.82 KG/M2 | WEIGHT: 148.2 LBS | TEMPERATURE: 97.8 F | DIASTOLIC BLOOD PRESSURE: 70 MMHG

## 2018-02-21 DIAGNOSIS — C50.912 MALIGNANT NEOPLASM OF LEFT FEMALE BREAST, UNSPECIFIED ESTROGEN RECEPTOR STATUS, UNSPECIFIED SITE OF BREAST (HCC): Primary | ICD-10-CM

## 2018-02-21 PROCEDURE — 99024 POSTOP FOLLOW-UP VISIT: CPT | Performed by: PHYSICIAN ASSISTANT

## 2018-02-21 RX ORDER — MONTELUKAST SODIUM 10 MG/1
10 TABLET ORAL DAILY
Qty: 90 TABLET | Refills: 3 | Status: SHIPPED | OUTPATIENT
Start: 2018-02-21 | End: 2019-02-06 | Stop reason: SDUPTHER

## 2018-02-22 ENCOUNTER — TELEPHONE (OUTPATIENT)
Dept: SURGERY | Age: 78
End: 2018-02-22

## 2018-02-22 NOTE — TELEPHONE ENCOUNTER
Called and gave patient her appointment to Dr. Karolina Garcia on 03/07/18 at 11:00    She is also aware of her follow up appointment with Dr. Andrea Givens on 03/12/18

## 2018-03-02 ENCOUNTER — HOSPITAL ENCOUNTER (OUTPATIENT)
Dept: RADIATION ONCOLOGY | Facility: HOSPITAL | Age: 78
Setting detail: RADIATION/ONCOLOGY SERIES
End: 2018-03-02

## 2018-03-13 ENCOUNTER — HOSPITAL ENCOUNTER (OUTPATIENT)
Dept: WOMENS IMAGING | Age: 78
Discharge: HOME | End: 2018-03-13
Payer: MEDICARE

## 2018-03-13 DIAGNOSIS — Z78.0 POST-MENOPAUSAL: ICD-10-CM

## 2018-03-13 LAB
LAB AP CASE REPORT: NORMAL
Lab: NORMAL
PATH REPORT.FINAL DX SPEC: NORMAL

## 2018-03-13 PROCEDURE — 77080 DXA BONE DENSITY AXIAL: CPT

## 2018-03-14 ENCOUNTER — OFFICE VISIT (OUTPATIENT)
Dept: SURGERY | Age: 78
End: 2018-03-14

## 2018-03-14 VITALS
WEIGHT: 146 LBS | HEIGHT: 66 IN | DIASTOLIC BLOOD PRESSURE: 60 MMHG | BODY MASS INDEX: 23.46 KG/M2 | SYSTOLIC BLOOD PRESSURE: 112 MMHG

## 2018-03-14 DIAGNOSIS — Z17.0 MALIGNANT NEOPLASM OF CENTRAL PORTION OF LEFT BREAST IN FEMALE, ESTROGEN RECEPTOR POSITIVE (HCC): Primary | ICD-10-CM

## 2018-03-14 DIAGNOSIS — C50.112 MALIGNANT NEOPLASM OF CENTRAL PORTION OF LEFT BREAST IN FEMALE, ESTROGEN RECEPTOR POSITIVE (HCC): Primary | ICD-10-CM

## 2018-03-14 PROCEDURE — 99024 POSTOP FOLLOW-UP VISIT: CPT | Performed by: SURGERY

## 2018-03-23 PROBLEM — C50.112 MALIGNANT NEOPLASM OF CENTRAL PORTION OF LEFT BREAST IN FEMALE, ESTROGEN RECEPTOR POSITIVE (HCC): Status: ACTIVE | Noted: 2018-01-09

## 2018-03-23 PROBLEM — Z17.0 MALIGNANT NEOPLASM OF CENTRAL PORTION OF LEFT BREAST IN FEMALE, ESTROGEN RECEPTOR POSITIVE (HCC): Status: ACTIVE | Noted: 2018-01-09

## 2018-03-30 ENCOUNTER — CONSULT (OUTPATIENT)
Dept: RADIATION ONCOLOGY | Facility: HOSPITAL | Age: 78
End: 2018-03-30

## 2018-03-30 ENCOUNTER — HOSPITAL ENCOUNTER (OUTPATIENT)
Dept: RADIATION ONCOLOGY | Facility: HOSPITAL | Age: 78
Discharge: HOME | End: 2018-03-30
Attending: RADIOLOGY

## 2018-03-30 VITALS
DIASTOLIC BLOOD PRESSURE: 67 MMHG | SYSTOLIC BLOOD PRESSURE: 149 MMHG | WEIGHT: 146 LBS | HEIGHT: 66 IN | BODY MASS INDEX: 23.46 KG/M2

## 2018-03-30 DIAGNOSIS — Z71.9 ENCOUNTER FOR CONSULTATION: ICD-10-CM

## 2018-03-30 DIAGNOSIS — Z17.0 MALIGNANT NEOPLASM OF CENTRAL PORTION OF LEFT BREAST IN FEMALE, ESTROGEN RECEPTOR POSITIVE (HCC): Primary | ICD-10-CM

## 2018-03-30 DIAGNOSIS — Z78.9 NON-SMOKER: ICD-10-CM

## 2018-03-30 DIAGNOSIS — C50.112 MALIGNANT NEOPLASM OF CENTRAL PORTION OF LEFT BREAST IN FEMALE, ESTROGEN RECEPTOR POSITIVE (HCC): Primary | ICD-10-CM

## 2018-03-30 DIAGNOSIS — Z98.890 S/P LUMPECTOMY, LEFT BREAST: ICD-10-CM

## 2018-03-30 DIAGNOSIS — Z98.890 S/P LYMPH NODE BIOPSY: ICD-10-CM

## 2018-03-30 PROCEDURE — 77334 RADIATION TREATMENT AID(S): CPT | Performed by: RADIOLOGY

## 2018-03-30 PROCEDURE — 77290 THER RAD SIMULAJ FIELD CPLX: CPT | Performed by: RADIOLOGY

## 2018-03-30 RX ORDER — POTASSIUM CHLORIDE 750 MG/1
10 TABLET, EXTENDED RELEASE ORAL 2 TIMES DAILY
COMMUNITY
End: 2019-10-02 | Stop reason: ALTCHOICE

## 2018-03-30 RX ORDER — DIPHENHYDRAMINE HCL 25 MG
25 CAPSULE ORAL EVERY 6 HOURS PRN
COMMUNITY

## 2018-03-30 RX ORDER — PANTOPRAZOLE SODIUM 40 MG/1
40 TABLET, DELAYED RELEASE ORAL 2 TIMES DAILY
COMMUNITY

## 2018-03-30 RX ORDER — PHENOL 1.4 %
600 AEROSOL, SPRAY (ML) MUCOUS MEMBRANE DAILY
COMMUNITY

## 2018-03-30 RX ORDER — SPIRONOLACTONE AND HYDROCHLOROTHIAZIDE 25; 25 MG/1; MG/1
1 TABLET ORAL DAILY
COMMUNITY

## 2018-03-30 RX ORDER — MONTELUKAST SODIUM 10 MG/1
10 TABLET ORAL NIGHTLY
COMMUNITY

## 2018-03-30 RX ORDER — ATORVASTATIN CALCIUM 80 MG/1
80 TABLET, FILM COATED ORAL DAILY
COMMUNITY

## 2018-03-30 RX ORDER — ASPIRIN 81 MG/1
81 TABLET, CHEWABLE ORAL DAILY
COMMUNITY

## 2018-03-30 RX ORDER — DOCUSATE SODIUM 100 MG/1
100 CAPSULE, LIQUID FILLED ORAL 2 TIMES DAILY
COMMUNITY

## 2018-04-02 ENCOUNTER — HOSPITAL ENCOUNTER (OUTPATIENT)
Dept: RADIATION ONCOLOGY | Facility: HOSPITAL | Age: 78
Setting detail: RADIATION/ONCOLOGY SERIES
End: 2018-04-02

## 2018-04-23 PROCEDURE — 77334 RADIATION TREATMENT AID(S): CPT | Performed by: RADIOLOGY

## 2018-04-23 PROCEDURE — 77300 RADIATION THERAPY DOSE PLAN: CPT | Performed by: RADIOLOGY

## 2018-04-23 PROCEDURE — 77295 3-D RADIOTHERAPY PLAN: CPT | Performed by: RADIOLOGY

## 2018-04-24 ENCOUNTER — HOSPITAL ENCOUNTER (OUTPATIENT)
Dept: RADIATION ONCOLOGY | Facility: HOSPITAL | Age: 78
Discharge: HOME | End: 2018-04-24

## 2018-04-24 ENCOUNTER — APPOINTMENT (OUTPATIENT)
Dept: PHYSICAL THERAPY | Facility: HOSPITAL | Age: 78
End: 2018-04-24

## 2018-04-24 PROCEDURE — 77417 THER RADIOLOGY PORT IMAGE(S): CPT | Performed by: RADIOLOGY

## 2018-04-24 PROCEDURE — 77412 RADIATION TX DELIVERY LVL 3: CPT | Performed by: RADIOLOGY

## 2018-04-25 ENCOUNTER — HOSPITAL ENCOUNTER (OUTPATIENT)
Dept: RADIATION ONCOLOGY | Facility: HOSPITAL | Age: 78
Discharge: HOME | End: 2018-04-25

## 2018-04-25 ENCOUNTER — DOCUMENTATION (OUTPATIENT)
Dept: ULTRASOUND IMAGING | Facility: HOSPITAL | Age: 78
End: 2018-04-25

## 2018-04-25 PROCEDURE — 77412 RADIATION TX DELIVERY LVL 3: CPT | Performed by: RADIOLOGY

## 2018-04-26 ENCOUNTER — HOSPITAL ENCOUNTER (OUTPATIENT)
Dept: RADIATION ONCOLOGY | Facility: HOSPITAL | Age: 78
Discharge: HOME | End: 2018-04-26

## 2018-04-26 PROCEDURE — 77412 RADIATION TX DELIVERY LVL 3: CPT | Performed by: RADIOLOGY

## 2018-04-26 NOTE — PROGRESS NOTES
Coreen Wilson is here today for radiation therapy treatment for her left breast cancer. The left breast has no change in color; Aquaphor ointment recommended.  Patient is a 0 on the pain scale.  She is having a pain of the left upper underside arm intermittently.    Patient given education bag with literature on local support groups - [place] breast cancer support group, Look Good Feel Better Class, and Lymphedema Support Group. Information also given on [place] [facility] resources, genetics and sexuality and intimacy for the patient undergoing cancer treatment. /10 on the pain scale.  We talked for about 30 minutes- all questions answered.  Patient will see the lymphedema therapist this Friday.

## 2018-04-27 ENCOUNTER — HOSPITAL ENCOUNTER (OUTPATIENT)
Dept: RADIATION ONCOLOGY | Facility: HOSPITAL | Age: 78
Discharge: HOME | End: 2018-04-27

## 2018-04-27 ENCOUNTER — APPOINTMENT (OUTPATIENT)
Dept: RADIATION ONCOLOGY | Facility: HOSPITAL | Age: 78
End: 2018-04-27

## 2018-04-27 ENCOUNTER — HOSPITAL ENCOUNTER (OUTPATIENT)
Dept: PHYSICAL THERAPY | Facility: HOSPITAL | Age: 78
Discharge: HOME | End: 2018-04-27
Admitting: PHYSICIAN ASSISTANT

## 2018-04-27 DIAGNOSIS — Z98.890 S/P LYMPH NODE BIOPSY: ICD-10-CM

## 2018-04-27 DIAGNOSIS — Z91.89 AT RISK FOR LYMPHEDEMA: Primary | ICD-10-CM

## 2018-04-27 DIAGNOSIS — Z98.890 S/P LUMPECTOMY, LEFT BREAST: ICD-10-CM

## 2018-04-27 PROCEDURE — G8990 OTHER PT/OT CURRENT STATUS: HCPCS | Performed by: PHYSICAL THERAPIST

## 2018-04-27 PROCEDURE — G8991 OTHER PT/OT GOAL STATUS: HCPCS | Performed by: PHYSICAL THERAPIST

## 2018-04-27 PROCEDURE — 77336 RADIATION PHYSICS CONSULT: CPT | Performed by: RADIOLOGY

## 2018-04-27 PROCEDURE — G8992 OTHER PT/OT  D/C STATUS: HCPCS | Performed by: PHYSICAL THERAPIST

## 2018-04-27 PROCEDURE — 77412 RADIATION TX DELIVERY LVL 3: CPT | Performed by: RADIOLOGY

## 2018-04-27 PROCEDURE — 97162 PT EVAL MOD COMPLEX 30 MIN: CPT | Performed by: PHYSICAL THERAPIST

## 2018-04-27 NOTE — THERAPY DISCHARGE NOTE
Outpatient Physical Therapy Lymphedema Initial Evaluation & Discharge  [facility]     Patient Name: Coreen Wilson  : 1940  MRN: [id number]  Today's Date: 2018      Visit Date: 2018    Visit Dx:    ICD-10-CM ICD-9-CM   1. At risk for lymphedema Z91.89 V49.89   2. S/P lumpectomy, left breast Z98.890 V45.89   3. 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 Servin 2018. She has begun radiation this week and has completed  treatments.  -HR      Patient/Caregiver Goals Know what to do to help the symptoms  -HR      Patient's Rating of General Health Good  -HR      Hand Dominance right-handed  -HR      Occupation/sports/leisure activities  for [employer]  -HR         Pain     Pain Location Shoulder  -HR      Pain at Present 1  -HR      Pain at Best 1  -HR      Pain at Worst 4  -HR      Pain Frequency Intermittent  -HR      Pain Description Dull  -HR      Pain Comments shoulder pain is chronic  -HR         Fall Risk Assessment    Any falls in the past year: No  -HR         Services    Prior Rehab/Home Health Experiences No  -HR      Are you currently receiving Home Health services No  -HR      Do you plan to receive Home Health services in the near future No  -HR         Daily Activities     Primary Language English  -HR      Are you able to read Yes  -HR      Are you able to write Yes  -HR      How does patient learn best? Listening;Reading  -HR      Teaching needs identified Management of Condition  -HR      Does patient have problems with the following? None  -HR      Barriers to learning None  -HR         Safety    Are you being hurt, hit, or frightened by anyone at home or in your life? No  -HR      Are you being neglected by a caregiver No  -HR        User Key  (r) = Recorded By, (t) = Taken By, (c) = Cosigned By    Initials Name Provider Type    HR Alejandra Morrison, PT DPT Physical Therapist                Lymphedema     Row Name 04/27/18 1500             Subjective Pain    Able to rate subjective pain? yes  -HR      Pre-Treatment Pain Level 2  -HR         Subjective Comments    Subjective Comments She really didn't know anything about lymphedema before now.  -HR         Lymphedema Assessment    Lymphedema Classification LUE:;Trunk:;at risk/stage 0  -HR      Lymphedema Cancer Related Sx lumpectomy;sentinel node biopsy  -HR      Lymph Nodes Removed # 3  -HR      Chemo Received no  -HR      Radiation Therapy Received yes  -HR      Radiation Treatments #/Timeframe Completed 4/16  -HR      Infections or Cellulitis? no  -HR         Lymphedema Edema Assessment    Edema Assessment Comment No edema noted  -HR         Skin Changes/Observations    Location/Assessment Upper Quadrant  -HR      Upper Quadrant Conditions left:  -HR      Upper Quadrant Color/Pigment left:;fat necrosis  -HR      Upper Quadrant Skin Details Feels like a moderate amount of necrotic fat from superior to the lumpectomy scar towards the middle of the breast.  -HR      Skin Observations Comment Scar tissue slightly darker pink than skin.   -HR         Lymphedema Measurements    Measurement Type(s) Quick Girth  -HR      Quick Girth Areas Upper extremities  -HR         LUE Quick Girth (cm)    Axilla 28.8 cm  -HR      Mid upper arm  24.5 cm  -HR      Elbow 23.4 cm  -HR      Mid forearm 17.5 cm  -HR      Wrist crease 17.2 cm  -HR      Web space 19 cm  -HR      LUE Quick Girth Total 130.4  -HR         RUE Quick Girth (cm)    Axilla 29.8 cm  -HR      Mid upper arm 25 cm  -HR      Elbow 23.8 cm  -HR      Mid forearm 18.5 cm  -HR      Wrist crease 17 cm  -HR      Web space 19 cm  -HR      RUE Quick Girth Total 133.1  -HR         Manual Lymphatic Drainage    Manual Lymphatic Drainage Comments Quickly discussed  -HR         Compression/Skin Care    Compression/Skin Care Comments Educated about skin care importance.   -HR        User Key  (r) = Recorded By, (t) = Taken By, (c) = Cosigned By    Initials Name Provider Type    HR Alejandra Morrison, PT DPT Physical Therapist                             Therapy Education  Education Details: reviewed risk reduction practices  Given: Symptoms/condition management, HEP  Program: Reinforced  How Provided: Verbal, Demonstration, Written  Provided to: Patient, Caregiver  Level of Understanding: Verbalized          Exercises     Row Name 04/27/18 1500             Subjective Comments    Subjective Comments She really didn't know anything about lymphedema before now.  -HR         Subjective Pain    Able to rate subjective pain? yes  -HR      Pre-Treatment Pain Level 2  -HR        User Key  (r) = Recorded By, (t) = Taken By, (c) = Cosigned By    Initials Name Provider Type    ARACELY Morrison, PT DPT Physical Therapist                              PT OP Goals     Row Name 04/27/18 1500          PT Short Term Goals    STG 1 Assessment completed and education given regarding condition of lymphedema and risk reduction practices suggested by NLN.  -HR     STG 1 Progress New;Met  -HR       User Key  (r) = Recorded By, (t) = Taken By, (c) = Cosigned By    Initials Name Provider Type    ARACELY Morrison, PT DPT Physical Therapist                         Time Calculation:         Therapy Charges for Today      Code Description Service Date Service Provider Modifiers Qty    05275646776 HC PT OTHER PRIME FUNCT CURRENT 4/27/2018 Alejandra Morrison, PT DPT GP, CH 1    82002642852 HC PT OTHER PRIME FUNCT PROJECTED 4/27/2018 Alejandra Morrison, PT DPT GP, CH 1    98937149890 HC PT OTHER PRIME FUNCT DISCHARGE 4/27/2018 Alejandra Morrison, PT DPT GP, CH 1    35541925151 HC PT EVAL MOD COMPLEXITY 4 4/27/2018 Alejandra Morrison, PT DPT GP 1          PT G-Codes  Functional Limitation: Other PT primary  Other PT Primary Current Status (): 0 percent impaired, limited or restricted  Other PT Primary Goal Status (): 0 percent impaired, limited or restricted  Other PT Primary Discharge Status (): 0 percent impaired, limited or restricted            Alejandra Morrison, PT DPT  4/27/2018

## 2018-04-30 ENCOUNTER — HOSPITAL ENCOUNTER (OUTPATIENT)
Dept: RADIATION ONCOLOGY | Facility: HOSPITAL | Age: 78
Setting detail: RADIATION/ONCOLOGY SERIES
Discharge: HOME | End: 2018-04-30

## 2018-04-30 PROCEDURE — 77412 RADIATION TX DELIVERY LVL 3: CPT | Performed by: RADIOLOGY

## 2018-05-01 ENCOUNTER — HOSPITAL ENCOUNTER (OUTPATIENT)
Dept: RADIATION ONCOLOGY | Facility: HOSPITAL | Age: 78
Setting detail: RADIATION/ONCOLOGY SERIES
End: 2018-05-01

## 2018-05-01 ENCOUNTER — DOCUMENTATION (OUTPATIENT)
Dept: RADIATION ONCOLOGY | Facility: HOSPITAL | Age: 78
End: 2018-05-01

## 2018-05-01 ENCOUNTER — APPOINTMENT (OUTPATIENT)
Dept: RADIATION ONCOLOGY | Facility: HOSPITAL | Age: 78
End: 2018-05-01

## 2018-05-01 PROCEDURE — 77412 RADIATION TX DELIVERY LVL 3: CPT | Performed by: RADIOLOGY

## 2018-05-01 PROCEDURE — 77417 THER RADIOLOGY PORT IMAGE(S): CPT | Performed by: RADIOLOGY

## 2018-05-01 NOTE — PROGRESS NOTES
Coreen Wilson is here today for radiation therapy treatment for her left breast cancer. The left breast has no change in color, using Aloe.  Patient is a 0/10 on the pain scale. Patient has a knot in her left breast that is hard.  Surgery done by Dr. Servin.  She had 2 coils placed during surgery.

## 2018-05-02 ENCOUNTER — HOSPITAL ENCOUNTER (OUTPATIENT)
Dept: RADIATION ONCOLOGY | Facility: HOSPITAL | Age: 78
Setting detail: RADIATION/ONCOLOGY SERIES
Discharge: HOME | End: 2018-05-02

## 2018-05-02 PROCEDURE — 77412 RADIATION TX DELIVERY LVL 3: CPT | Performed by: RADIOLOGY

## 2018-05-03 ENCOUNTER — HOSPITAL ENCOUNTER (OUTPATIENT)
Dept: RADIATION ONCOLOGY | Facility: HOSPITAL | Age: 78
Setting detail: RADIATION/ONCOLOGY SERIES
Discharge: HOME | End: 2018-05-03

## 2018-05-03 PROCEDURE — 77336 RADIATION PHYSICS CONSULT: CPT | Performed by: RADIOLOGY

## 2018-05-03 PROCEDURE — 77412 RADIATION TX DELIVERY LVL 3: CPT | Performed by: RADIOLOGY

## 2018-05-04 ENCOUNTER — HOSPITAL ENCOUNTER (OUTPATIENT)
Dept: RADIATION ONCOLOGY | Facility: HOSPITAL | Age: 78
Setting detail: RADIATION/ONCOLOGY SERIES
Discharge: HOME | End: 2018-05-04

## 2018-05-04 PROCEDURE — 77412 RADIATION TX DELIVERY LVL 3: CPT | Performed by: RADIOLOGY

## 2018-05-07 ENCOUNTER — DOCUMENTATION (OUTPATIENT)
Dept: RADIATION ONCOLOGY | Facility: HOSPITAL | Age: 78
End: 2018-05-07

## 2018-05-07 ENCOUNTER — HOSPITAL ENCOUNTER (OUTPATIENT)
Dept: RADIATION ONCOLOGY | Facility: HOSPITAL | Age: 78
Setting detail: RADIATION/ONCOLOGY SERIES
Discharge: HOME | End: 2018-05-07

## 2018-05-07 PROCEDURE — 77412 RADIATION TX DELIVERY LVL 3: CPT | Performed by: RADIOLOGY

## 2018-05-08 ENCOUNTER — HOSPITAL ENCOUNTER (OUTPATIENT)
Dept: RADIATION ONCOLOGY | Facility: HOSPITAL | Age: 78
Setting detail: RADIATION/ONCOLOGY SERIES
Discharge: HOME | End: 2018-05-08

## 2018-05-08 PROCEDURE — 77412 RADIATION TX DELIVERY LVL 3: CPT | Performed by: RADIOLOGY

## 2018-05-08 PROCEDURE — 77417 THER RADIOLOGY PORT IMAGE(S): CPT | Performed by: RADIOLOGY

## 2018-05-08 NOTE — PROGRESS NOTES
Coreen Wilson is here today for radiation therapy treatment for her left breast cancer. The left breast has no change in color.  Patient is a 0/10 on the pain scale.

## 2018-05-09 ENCOUNTER — HOSPITAL ENCOUNTER (OUTPATIENT)
Dept: RADIATION ONCOLOGY | Facility: HOSPITAL | Age: 78
Setting detail: RADIATION/ONCOLOGY SERIES
Discharge: HOME | End: 2018-05-09

## 2018-05-09 PROCEDURE — 77412 RADIATION TX DELIVERY LVL 3: CPT | Performed by: RADIOLOGY

## 2018-05-10 ENCOUNTER — HOSPITAL ENCOUNTER (OUTPATIENT)
Dept: RADIATION ONCOLOGY | Facility: HOSPITAL | Age: 78
Setting detail: RADIATION/ONCOLOGY SERIES
Discharge: HOME | End: 2018-05-10

## 2018-05-10 PROCEDURE — 77412 RADIATION TX DELIVERY LVL 3: CPT | Performed by: RADIOLOGY

## 2018-05-10 PROCEDURE — 77336 RADIATION PHYSICS CONSULT: CPT | Performed by: RADIOLOGY

## 2018-05-11 ENCOUNTER — APPOINTMENT (OUTPATIENT)
Dept: RADIATION ONCOLOGY | Facility: HOSPITAL | Age: 78
End: 2018-05-11

## 2018-05-11 ENCOUNTER — HOSPITAL ENCOUNTER (OUTPATIENT)
Dept: RADIATION ONCOLOGY | Facility: HOSPITAL | Age: 78
Discharge: HOME | End: 2018-05-11

## 2018-05-11 PROCEDURE — 77412 RADIATION TX DELIVERY LVL 3: CPT | Performed by: RADIOLOGY

## 2018-05-14 ENCOUNTER — HOSPITAL ENCOUNTER (OUTPATIENT)
Dept: RADIATION ONCOLOGY | Facility: HOSPITAL | Age: 78
Setting detail: RADIATION/ONCOLOGY SERIES
Discharge: HOME | End: 2018-05-14

## 2018-05-14 PROCEDURE — 77412 RADIATION TX DELIVERY LVL 3: CPT | Performed by: RADIOLOGY

## 2018-05-15 ENCOUNTER — HOSPITAL ENCOUNTER (OUTPATIENT)
Dept: RADIATION ONCOLOGY | Facility: HOSPITAL | Age: 78
Setting detail: RADIATION/ONCOLOGY SERIES
Discharge: HOME | End: 2018-05-15

## 2018-05-15 ENCOUNTER — DOCUMENTATION (OUTPATIENT)
Dept: RADIATION ONCOLOGY | Facility: HOSPITAL | Age: 78
End: 2018-05-15

## 2018-05-15 PROCEDURE — 77412 RADIATION TX DELIVERY LVL 3: CPT | Performed by: RADIOLOGY

## 2018-05-16 NOTE — PROGRESS NOTES
Coreen Wilson is here today for radiation therapy treatment for her left breast cancer. The left breast has no change in color.  Patient is a 0/10 on the pain scale.  Patient completes treatment today.  She does have the coil in her left breast that can be palpated.

## 2018-06-20 ENCOUNTER — OFFICE VISIT (OUTPATIENT)
Dept: SURGERY | Age: 78
End: 2018-06-20
Payer: MEDICARE

## 2018-06-20 VITALS
WEIGHT: 144.8 LBS | SYSTOLIC BLOOD PRESSURE: 112 MMHG | TEMPERATURE: 98.4 F | BODY MASS INDEX: 23.27 KG/M2 | HEIGHT: 66 IN | DIASTOLIC BLOOD PRESSURE: 60 MMHG

## 2018-06-20 DIAGNOSIS — C50.112 MALIGNANT NEOPLASM OF CENTRAL PORTION OF LEFT BREAST IN FEMALE, ESTROGEN RECEPTOR POSITIVE (HCC): Primary | ICD-10-CM

## 2018-06-20 DIAGNOSIS — Z17.0 MALIGNANT NEOPLASM OF CENTRAL PORTION OF LEFT BREAST IN FEMALE, ESTROGEN RECEPTOR POSITIVE (HCC): Primary | ICD-10-CM

## 2018-06-20 PROCEDURE — G8427 DOCREV CUR MEDS BY ELIG CLIN: HCPCS | Performed by: SURGERY

## 2018-06-20 PROCEDURE — 4040F PNEUMOC VAC/ADMIN/RCVD: CPT | Performed by: SURGERY

## 2018-06-20 PROCEDURE — 99213 OFFICE O/P EST LOW 20 MIN: CPT | Performed by: SURGERY

## 2018-06-20 PROCEDURE — G8399 PT W/DXA RESULTS DOCUMENT: HCPCS | Performed by: SURGERY

## 2018-06-20 PROCEDURE — 1123F ACP DISCUSS/DSCN MKR DOCD: CPT | Performed by: SURGERY

## 2018-06-20 PROCEDURE — G8420 CALC BMI NORM PARAMETERS: HCPCS | Performed by: SURGERY

## 2018-06-20 PROCEDURE — 1090F PRES/ABSN URINE INCON ASSESS: CPT | Performed by: SURGERY

## 2018-06-20 PROCEDURE — 1036F TOBACCO NON-USER: CPT | Performed by: SURGERY

## 2018-06-20 RX ORDER — LETROZOLE 2.5 MG/1
TABLET, FILM COATED ORAL
COMMUNITY
Start: 2018-06-08

## 2018-06-29 PROBLEM — Z92.3 HISTORY OF RADIATION THERAPY: Status: ACTIVE | Noted: 2018-06-29

## 2018-06-29 PROBLEM — Z85.3 ENCOUNTER FOR FOLLOW-UP SURVEILLANCE OF BREAST CANCER: Status: ACTIVE | Noted: 2018-06-29

## 2018-06-29 PROBLEM — Z08 ENCOUNTER FOR FOLLOW-UP SURVEILLANCE OF BREAST CANCER: Status: ACTIVE | Noted: 2018-06-29

## 2018-07-02 ENCOUNTER — HOSPITAL ENCOUNTER (OUTPATIENT)
Dept: RADIATION ONCOLOGY | Facility: HOSPITAL | Age: 78
Setting detail: RADIATION/ONCOLOGY SERIES
End: 2018-07-02

## 2018-07-26 DIAGNOSIS — C50.112 MALIGNANT NEOPLASM OF CENTRAL PORTION OF LEFT BREAST IN FEMALE, ESTROGEN RECEPTOR POSITIVE (HCC): Primary | ICD-10-CM

## 2018-07-26 DIAGNOSIS — Z17.0 MALIGNANT NEOPLASM OF CENTRAL PORTION OF LEFT BREAST IN FEMALE, ESTROGEN RECEPTOR POSITIVE (HCC): Primary | ICD-10-CM

## 2018-08-01 ENCOUNTER — OFFICE VISIT (OUTPATIENT)
Dept: RADIATION ONCOLOGY | Facility: HOSPITAL | Age: 78
End: 2018-08-01

## 2018-08-01 ENCOUNTER — HOSPITAL ENCOUNTER (OUTPATIENT)
Dept: RADIATION ONCOLOGY | Facility: HOSPITAL | Age: 78
Setting detail: RADIATION/ONCOLOGY SERIES
End: 2018-08-01

## 2018-08-01 VITALS
BODY MASS INDEX: 22.9 KG/M2 | SYSTOLIC BLOOD PRESSURE: 142 MMHG | HEIGHT: 66 IN | WEIGHT: 142.5 LBS | DIASTOLIC BLOOD PRESSURE: 78 MMHG

## 2018-08-01 DIAGNOSIS — Z85.3 ENCOUNTER FOR FOLLOW-UP SURVEILLANCE OF BREAST CANCER: ICD-10-CM

## 2018-08-01 DIAGNOSIS — Z98.890 S/P LYMPH NODE BIOPSY: ICD-10-CM

## 2018-08-01 DIAGNOSIS — Z78.9 NON-SMOKER: ICD-10-CM

## 2018-08-01 DIAGNOSIS — Z98.890 S/P LUMPECTOMY, LEFT BREAST: ICD-10-CM

## 2018-08-01 DIAGNOSIS — Z17.0 MALIGNANT NEOPLASM OF CENTRAL PORTION OF LEFT BREAST IN FEMALE, ESTROGEN RECEPTOR POSITIVE (HCC): Primary | ICD-10-CM

## 2018-08-01 DIAGNOSIS — Z92.3 HISTORY OF RADIATION THERAPY: ICD-10-CM

## 2018-08-01 DIAGNOSIS — R00.1 BRADYCARDIA WITH 41-50 BEATS PER MINUTE: ICD-10-CM

## 2018-08-01 DIAGNOSIS — R53.83 OTHER FATIGUE: ICD-10-CM

## 2018-08-01 DIAGNOSIS — Z08 ENCOUNTER FOR FOLLOW-UP SURVEILLANCE OF BREAST CANCER: ICD-10-CM

## 2018-08-01 DIAGNOSIS — C50.112 MALIGNANT NEOPLASM OF CENTRAL PORTION OF LEFT BREAST IN FEMALE, ESTROGEN RECEPTOR POSITIVE (HCC): Primary | ICD-10-CM

## 2018-08-01 PROCEDURE — G0463 HOSPITAL OUTPT CLINIC VISIT: HCPCS | Performed by: RADIOLOGY

## 2018-08-01 RX ORDER — LETROZOLE 2.5 MG/1
2.5 TABLET, FILM COATED ORAL DAILY
COMMUNITY
End: 2019-02-06 | Stop reason: SINTOL

## 2018-08-24 ENCOUNTER — HOSPITAL ENCOUNTER (OUTPATIENT)
Dept: WOMENS IMAGING | Age: 78
Discharge: HOME | End: 2018-08-24
Payer: MEDICARE

## 2018-08-24 DIAGNOSIS — C50.112 MALIGNANT NEOPLASM OF CENTRAL PORTION OF LEFT BREAST IN FEMALE, ESTROGEN RECEPTOR POSITIVE (HCC): ICD-10-CM

## 2018-08-24 DIAGNOSIS — Z17.0 MALIGNANT NEOPLASM OF CENTRAL PORTION OF LEFT BREAST IN FEMALE, ESTROGEN RECEPTOR POSITIVE (HCC): ICD-10-CM

## 2018-08-24 PROCEDURE — G0279 TOMOSYNTHESIS, MAMMO: HCPCS

## 2018-09-12 ENCOUNTER — TELEPHONE (OUTPATIENT)
Dept: SURGERY | Age: 78
End: 2018-09-12

## 2018-09-12 NOTE — TELEPHONE ENCOUNTER
Spoke with patient to infrom her of her upcoming appt I scheduled. 9/17/18 @ 3:45 pm to see Dr. Mackenzie Seaman. Patient satisfied.

## 2018-09-17 ENCOUNTER — OFFICE VISIT (OUTPATIENT)
Dept: SURGERY | Age: 78
End: 2018-09-17
Payer: MEDICARE

## 2018-09-17 VITALS
TEMPERATURE: 97.4 F | HEART RATE: 53 BPM | SYSTOLIC BLOOD PRESSURE: 128 MMHG | BODY MASS INDEX: 22.92 KG/M2 | DIASTOLIC BLOOD PRESSURE: 78 MMHG | WEIGHT: 142 LBS

## 2018-09-17 DIAGNOSIS — C50.112 MALIGNANT NEOPLASM OF CENTRAL PORTION OF LEFT BREAST IN FEMALE, ESTROGEN RECEPTOR POSITIVE (HCC): Primary | ICD-10-CM

## 2018-09-17 DIAGNOSIS — Z17.0 MALIGNANT NEOPLASM OF CENTRAL PORTION OF LEFT BREAST IN FEMALE, ESTROGEN RECEPTOR POSITIVE (HCC): Primary | ICD-10-CM

## 2018-09-17 DIAGNOSIS — Z98.890 S/P LUMPECTOMY, LEFT BREAST: ICD-10-CM

## 2018-09-17 PROCEDURE — 99213 OFFICE O/P EST LOW 20 MIN: CPT | Performed by: SURGERY

## 2018-09-17 PROCEDURE — 1090F PRES/ABSN URINE INCON ASSESS: CPT | Performed by: SURGERY

## 2018-09-17 PROCEDURE — G8420 CALC BMI NORM PARAMETERS: HCPCS | Performed by: SURGERY

## 2018-09-17 PROCEDURE — 1101F PT FALLS ASSESS-DOCD LE1/YR: CPT | Performed by: SURGERY

## 2018-09-17 PROCEDURE — G8399 PT W/DXA RESULTS DOCUMENT: HCPCS | Performed by: SURGERY

## 2018-09-17 PROCEDURE — 1123F ACP DISCUSS/DSCN MKR DOCD: CPT | Performed by: SURGERY

## 2018-09-17 PROCEDURE — 4040F PNEUMOC VAC/ADMIN/RCVD: CPT | Performed by: SURGERY

## 2018-09-17 PROCEDURE — G8427 DOCREV CUR MEDS BY ELIG CLIN: HCPCS | Performed by: SURGERY

## 2018-09-17 PROCEDURE — 1036F TOBACCO NON-USER: CPT | Performed by: SURGERY

## 2018-09-17 RX ORDER — CARVEDILOL 3.12 MG/1
3.12 TABLET ORAL 2 TIMES DAILY
COMMUNITY
Start: 2018-09-14

## 2018-09-17 NOTE — PROGRESS NOTES
HISTORY OF PRESENT ILLNESS:  Ms. Freddy Rodriguez  is a 69 y.o.   female   who is status post left lumpectomy and sentinel node on 2/6/2018 for 2 separate small cancers. Both are strongly ER positive, HER-2 negative, and have a low Ki-67. Both are low-grade invasive ductal carcinomas. They're close enough to each other to be contained within a single lumpectomy specimen        PATHOLOGY REVEALS:  FINAL DIAGNOSIS:    A. 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 Two foci of tumor are present. Largest focus of tumor is 6mm. Two previous biopsy sites identified. Tumor is 5mm from the closest (lateral) margin of excision. Margins of excision are negative. B. Lymph node, left axillary sentinel, excision:  No tumor seen in three lymph nodes (0/3). C.  Left breast, \"attached new margin\", excision:  Benign soft tissue. Negative for malignancy. Narrative   EXAMINATION: [facility] DIGITAL DIAGNOSTIC UNILATERAL LEFT 9/5/2018 3:23 PM   HISTORY: Status post left lumpectomy   Digital MLO and cc views the left breast are obtained. Surgical clips   are present in the left breast from prior lumpectomy. The breast parenchyma is heterogeneous. This is a type see parenchymal   pattern. 50-75% fibroglandular tissue. Digital MLO and cc views are reviewed. Rizwan synthesis is also obtained   in the MLO and CC projection. There is no architectural distortion.       Impression   Postsurgical change. There is no evidence of a neoplasm. Routine annual return date would be December 2018 for bilateral   digital mammography   ASSESSMENT: BI-RADS Category 2         PHYSICAL EXAM:  The  wounds look good with no evidence of infection, fluid accumulation, or skin necrosis.  She now has some radiation changes on the left that are improving with time      IMPRESSION:    Doing well s/p left lumpectomy     PLAN:  I will see her back in January for physical exam and bilateral mammograms. She will call if anything changes. She has seen Dr. Rajni Duvall, and he will arrange or hormonal therapy. I spent over 50% of this visit counseling patient. 20 minutes of face to face time with patient.

## 2018-09-18 PROBLEM — Z98.890 S/P LUMPECTOMY, LEFT BREAST: Status: ACTIVE | Noted: 2018-09-18

## 2018-09-25 DIAGNOSIS — C50.112 MALIGNANT NEOPLASM OF CENTRAL PORTION OF LEFT BREAST IN FEMALE, ESTROGEN RECEPTOR POSITIVE (HCC): Primary | ICD-10-CM

## 2018-09-25 DIAGNOSIS — Z17.0 MALIGNANT NEOPLASM OF CENTRAL PORTION OF LEFT BREAST IN FEMALE, ESTROGEN RECEPTOR POSITIVE (HCC): Primary | ICD-10-CM

## 2018-11-16 ENCOUNTER — TELEPHONE (OUTPATIENT)
Dept: SURGERY | Age: 78
End: 2018-11-16

## 2018-12-10 ENCOUNTER — HOSPITAL ENCOUNTER (OUTPATIENT)
Dept: WOMENS IMAGING | Age: 78
Discharge: HOME | End: 2018-12-10
Payer: MEDICARE

## 2018-12-10 DIAGNOSIS — C50.112 MALIGNANT NEOPLASM OF CENTRAL PORTION OF LEFT BREAST IN FEMALE, ESTROGEN RECEPTOR POSITIVE (HCC): ICD-10-CM

## 2018-12-10 DIAGNOSIS — Z17.0 MALIGNANT NEOPLASM OF CENTRAL PORTION OF LEFT BREAST IN FEMALE, ESTROGEN RECEPTOR POSITIVE (HCC): ICD-10-CM

## 2018-12-10 PROCEDURE — G0279 TOMOSYNTHESIS, MAMMO: HCPCS

## 2019-01-17 ENCOUNTER — HOSPITAL ENCOUNTER (OUTPATIENT)
Dept: MRI IMAGING | Age: 79
Discharge: HOME | End: 2019-01-17
Payer: MEDICARE

## 2019-01-17 DIAGNOSIS — Z86.73 PERSONAL HISTORY OF TIA (TRANSIENT ISCHEMIC ATTACK): ICD-10-CM

## 2019-01-17 PROCEDURE — 70551 MRI BRAIN STEM W/O DYE: CPT

## 2019-02-01 ENCOUNTER — HOSPITAL ENCOUNTER (OUTPATIENT)
Dept: RADIATION ONCOLOGY | Facility: HOSPITAL | Age: 79
Setting detail: RADIATION/ONCOLOGY SERIES
End: 2019-02-01

## 2019-02-05 PROBLEM — Z79.811 PROPHYLACTIC USE OF LETROZOLE (FEMARA): Status: ACTIVE | Noted: 2019-02-05

## 2019-02-06 ENCOUNTER — OFFICE VISIT (OUTPATIENT)
Dept: SURGERY | Age: 79
End: 2019-02-06
Payer: MEDICARE

## 2019-02-06 ENCOUNTER — OFFICE VISIT (OUTPATIENT)
Dept: RADIATION ONCOLOGY | Facility: HOSPITAL | Age: 79
End: 2019-02-06

## 2019-02-06 VITALS
HEIGHT: 66 IN | WEIGHT: 138.6 LBS | TEMPERATURE: 97.9 F | HEART RATE: 60 BPM | BODY MASS INDEX: 22.28 KG/M2 | OXYGEN SATURATION: 98 %

## 2019-02-06 VITALS
DIASTOLIC BLOOD PRESSURE: 72 MMHG | WEIGHT: 138.6 LBS | BODY MASS INDEX: 22.28 KG/M2 | SYSTOLIC BLOOD PRESSURE: 134 MMHG | HEIGHT: 66 IN

## 2019-02-06 DIAGNOSIS — Z98.890 S/P LUMPECTOMY, LEFT BREAST: Primary | ICD-10-CM

## 2019-02-06 DIAGNOSIS — Z98.890 S/P LUMPECTOMY, LEFT BREAST: ICD-10-CM

## 2019-02-06 DIAGNOSIS — C50.112 MALIGNANT NEOPLASM OF CENTRAL PORTION OF LEFT BREAST IN FEMALE, ESTROGEN RECEPTOR POSITIVE (HCC): ICD-10-CM

## 2019-02-06 DIAGNOSIS — Z78.9 NON-SMOKER: ICD-10-CM

## 2019-02-06 DIAGNOSIS — Z17.0 ESTROGEN RECEPTOR POSITIVE STATUS (ER+): ICD-10-CM

## 2019-02-06 DIAGNOSIS — Z92.3 HISTORY OF RADIATION THERAPY: ICD-10-CM

## 2019-02-06 DIAGNOSIS — Z79.811 PROPHYLACTIC USE OF LETROZOLE (FEMARA): ICD-10-CM

## 2019-02-06 DIAGNOSIS — C50.912 MALIGNANT NEOPLASM OF LEFT FEMALE BREAST, UNSPECIFIED ESTROGEN RECEPTOR STATUS, UNSPECIFIED SITE OF BREAST (HCC): ICD-10-CM

## 2019-02-06 DIAGNOSIS — Z17.0 MALIGNANT NEOPLASM OF CENTRAL PORTION OF LEFT BREAST IN FEMALE, ESTROGEN RECEPTOR POSITIVE (HCC): ICD-10-CM

## 2019-02-06 DIAGNOSIS — C50.912 INVASIVE DUCTAL CARCINOMA OF BREAST, STAGE 1, LEFT (HCC): Primary | ICD-10-CM

## 2019-02-06 DIAGNOSIS — Z98.890 S/P LYMPH NODE BIOPSY: ICD-10-CM

## 2019-02-06 PROCEDURE — 99213 OFFICE O/P EST LOW 20 MIN: CPT | Performed by: SURGERY

## 2019-02-06 PROCEDURE — G8484 FLU IMMUNIZE NO ADMIN: HCPCS | Performed by: SURGERY

## 2019-02-06 PROCEDURE — 4040F PNEUMOC VAC/ADMIN/RCVD: CPT | Performed by: SURGERY

## 2019-02-06 PROCEDURE — 1101F PT FALLS ASSESS-DOCD LE1/YR: CPT | Performed by: SURGERY

## 2019-02-06 PROCEDURE — G8399 PT W/DXA RESULTS DOCUMENT: HCPCS | Performed by: SURGERY

## 2019-02-06 PROCEDURE — 1036F TOBACCO NON-USER: CPT | Performed by: SURGERY

## 2019-02-06 PROCEDURE — G0463 HOSPITAL OUTPT CLINIC VISIT: HCPCS | Performed by: RADIOLOGY

## 2019-02-06 PROCEDURE — 1090F PRES/ABSN URINE INCON ASSESS: CPT | Performed by: SURGERY

## 2019-02-06 PROCEDURE — 1123F ACP DISCUSS/DSCN MKR DOCD: CPT | Performed by: SURGERY

## 2019-02-06 PROCEDURE — G8420 CALC BMI NORM PARAMETERS: HCPCS | Performed by: SURGERY

## 2019-02-06 PROCEDURE — G8427 DOCREV CUR MEDS BY ELIG CLIN: HCPCS | Performed by: SURGERY

## 2019-02-06 RX ORDER — MONTELUKAST SODIUM 10 MG/1
10 TABLET ORAL DAILY
Qty: 90 TABLET | Refills: 3 | Status: SHIPPED | OUTPATIENT
Start: 2019-02-06

## 2019-02-06 RX ORDER — GABAPENTIN 100 MG/1
CAPSULE ORAL AS NEEDED
COMMUNITY

## 2019-02-06 RX ORDER — MONTELUKAST SODIUM 10 MG/1
10 TABLET ORAL DAILY
Qty: 90 TABLET | Refills: 3 | Status: SHIPPED | OUTPATIENT
Start: 2019-02-06 | End: 2019-02-06 | Stop reason: SDUPTHER

## 2019-07-12 VITALS
WEIGHT: 140 LBS | HEIGHT: 66 IN | HEART RATE: 52 BPM | SYSTOLIC BLOOD PRESSURE: 132 MMHG | OXYGEN SATURATION: 97 % | DIASTOLIC BLOOD PRESSURE: 76 MMHG | BODY MASS INDEX: 22.5 KG/M2

## 2019-07-12 DIAGNOSIS — Z86.73 PERSONAL HISTORY OF TRANSIENT CEREBRAL ISCHEMIA: ICD-10-CM

## 2019-07-12 DIAGNOSIS — Z17.0 MALIGNANT NEOPLASM OF CENTRAL PORTION OF LEFT BREAST IN FEMALE, ESTROGEN RECEPTOR POSITIVE (HCC): ICD-10-CM

## 2019-07-12 DIAGNOSIS — C50.112 MALIGNANT NEOPLASM OF CENTRAL PORTION OF LEFT BREAST IN FEMALE, ESTROGEN RECEPTOR POSITIVE (HCC): ICD-10-CM

## 2019-07-12 RX ORDER — GABAPENTIN 100 MG/1
100 CAPSULE ORAL NIGHTLY
COMMUNITY

## 2019-08-07 ENCOUNTER — HOSPITAL ENCOUNTER (OUTPATIENT)
Dept: INFUSION THERAPY | Age: 79
Discharge: HOME | End: 2019-08-07
Payer: MEDICARE

## 2019-08-07 ENCOUNTER — OFFICE VISIT (OUTPATIENT)
Dept: SURGERY | Age: 79
End: 2019-08-07
Payer: MEDICARE

## 2019-08-07 ENCOUNTER — OFFICE VISIT (OUTPATIENT)
Dept: HEMATOLOGY | Age: 79
End: 2019-08-07
Payer: MEDICARE

## 2019-08-07 VITALS — HEART RATE: 56 BPM | DIASTOLIC BLOOD PRESSURE: 80 MMHG | SYSTOLIC BLOOD PRESSURE: 122 MMHG

## 2019-08-07 VITALS
BODY MASS INDEX: 19.53 KG/M2 | SYSTOLIC BLOOD PRESSURE: 122 MMHG | DIASTOLIC BLOOD PRESSURE: 60 MMHG | HEIGHT: 66 IN | WEIGHT: 121.5 LBS | HEART RATE: 49 BPM

## 2019-08-07 DIAGNOSIS — Z71.3 DIETARY COUNSELING: ICD-10-CM

## 2019-08-07 DIAGNOSIS — C50.112 MALIGNANT NEOPLASM OF CENTRAL PORTION OF LEFT BREAST IN FEMALE, ESTROGEN RECEPTOR POSITIVE (HCC): ICD-10-CM

## 2019-08-07 DIAGNOSIS — C50.112 MALIGNANT NEOPLASM OF CENTRAL PORTION OF LEFT BREAST IN FEMALE, ESTROGEN RECEPTOR POSITIVE (HCC): Primary | ICD-10-CM

## 2019-08-07 DIAGNOSIS — Z17.0 MALIGNANT NEOPLASM OF CENTRAL PORTION OF LEFT BREAST IN FEMALE, ESTROGEN RECEPTOR POSITIVE (HCC): Primary | ICD-10-CM

## 2019-08-07 DIAGNOSIS — Z91.89 SEDENTARY LIFESTYLE: ICD-10-CM

## 2019-08-07 DIAGNOSIS — R49.0 HOARSENESS: ICD-10-CM

## 2019-08-07 DIAGNOSIS — Z17.0 MALIGNANT NEOPLASM OF CENTRAL PORTION OF LEFT BREAST IN FEMALE, ESTROGEN RECEPTOR POSITIVE (HCC): ICD-10-CM

## 2019-08-07 DIAGNOSIS — Z98.890 S/P LUMPECTOMY, LEFT BREAST: ICD-10-CM

## 2019-08-07 DIAGNOSIS — Z85.3 PERSONAL HISTORY OF BREAST CANCER: Primary | ICD-10-CM

## 2019-08-07 DIAGNOSIS — Z00.00 HEALTH CARE MAINTENANCE: ICD-10-CM

## 2019-08-07 PROCEDURE — 99213 OFFICE O/P EST LOW 20 MIN: CPT | Performed by: SURGERY

## 2019-08-07 PROCEDURE — 99214 OFFICE O/P EST MOD 30 MIN: CPT | Performed by: INTERNAL MEDICINE

## 2019-08-07 PROCEDURE — 1123F ACP DISCUSS/DSCN MKR DOCD: CPT | Performed by: SURGERY

## 2019-08-07 PROCEDURE — 85025 COMPLETE CBC W/AUTO DIFF WBC: CPT

## 2019-08-07 PROCEDURE — G8428 CUR MEDS NOT DOCUMENT: HCPCS | Performed by: SURGERY

## 2019-08-07 PROCEDURE — 1036F TOBACCO NON-USER: CPT | Performed by: SURGERY

## 2019-08-07 PROCEDURE — G8420 CALC BMI NORM PARAMETERS: HCPCS | Performed by: SURGERY

## 2019-08-07 PROCEDURE — 4040F PNEUMOC VAC/ADMIN/RCVD: CPT | Performed by: SURGERY

## 2019-08-07 PROCEDURE — 99211 OFF/OP EST MAY X REQ PHY/QHP: CPT

## 2019-08-07 PROCEDURE — 1090F PRES/ABSN URINE INCON ASSESS: CPT | Performed by: SURGERY

## 2019-08-07 PROCEDURE — G8399 PT W/DXA RESULTS DOCUMENT: HCPCS | Performed by: SURGERY

## 2019-08-07 RX ORDER — MEMANTINE HYDROCHLORIDE 10 MG/1
10 TABLET ORAL 2 TIMES DAILY
COMMUNITY

## 2019-08-07 RX ORDER — TAMOXIFEN CITRATE 10 MG/1
10 TABLET ORAL DAILY
COMMUNITY
End: 2020-06-19 | Stop reason: SDUPTHER

## 2019-08-07 RX ORDER — CETIRIZINE HYDROCHLORIDE 10 MG/1
10 TABLET ORAL DAILY
COMMUNITY
End: 2020-02-10

## 2019-08-07 RX ORDER — CHOLECALCIFEROL (VITAMIN D3) 1250 MCG
CAPSULE ORAL
COMMUNITY

## 2019-08-07 RX ORDER — SPIRONOLACTONE 25 MG/1
25 TABLET ORAL DAILY
COMMUNITY

## 2019-08-07 NOTE — PROGRESS NOTES
respiratory effort   BREAST: no  dimpling, no palpable mass/nodules, no tenderness, no axillary or supraclavicular adenopathy,   CARDIOVASCULAR: RRR, no murmurs. lower extremity edema bilaterally  ABDOMEN: soft non-tender, active bowel sounds, no hepatosplenomegaly. No palpable masses. EXTREMITIES: warm, Full ROM of all fours extremities. No focal weakness. SKIN: warm, dry with no rashes or lesions  LYMPH: No cervical, clavicular, axillary, or inguinal lymphadenopathy  NEUROLOGIC: follows commands, non focal.   PSYCH: mood and affect appropriate. Alert and oriented to time and place and person. Relevant Lab findings: CBC unremarkable      Relevant Imaging studies: n/a      Assessment:    Desire Agustin was seen today for breast cancer. Diagnoses and all orders for this visit:    Malignant neoplasm of central portion of left breast in female, estrogen receptor positive ([facility] [address]  -     CBC Auto Differential; Future  -     CBC Auto Differential    Sedentary lifestyle    Health care maintenance    Dietary counseling    Hoarseness        Plan  Continue tamoxifen 5 mg daily  Follow-up with Dr. Kathrin Hodgkin with primary care physician for further  Diagnostic mammogram annually  She will discuss with primary care regarding referral to ENT for further work-up of hoarseness    Mandy CARREON am scribing for Vladimir Erazo MD. Electronically signed by Mandy Clark on 8/7/2019 at 10:11 AM.     I have seen, examined and reviewed this patient medication list, appropriate labs and imaging studies. I reviewed relevant medical records and others physicians notes. I discussed the plans of care with the patient. I answered all the questions to the patients satisfaction.     (Please note that portions of this note were completed with a voice recognition program. Efforts were made to edit the dictations but occasionally words are mis-transcribed.)

## 2019-09-06 PROBLEM — Z00.00 HEALTH CARE MAINTENANCE: Status: RESOLVED | Noted: 2019-08-07 | Resolved: 2019-09-06

## 2019-10-02 ENCOUNTER — LAB (OUTPATIENT)
Dept: LAB | Facility: HOSPITAL | Age: 79
End: 2019-10-02

## 2019-10-02 ENCOUNTER — OFFICE VISIT (OUTPATIENT)
Dept: CARDIOLOGY | Facility: CLINIC | Age: 79
End: 2019-10-02

## 2019-10-02 VITALS
SYSTOLIC BLOOD PRESSURE: 148 MMHG | BODY MASS INDEX: 19.77 KG/M2 | HEART RATE: 60 BPM | WEIGHT: 123 LBS | HEIGHT: 66 IN | DIASTOLIC BLOOD PRESSURE: 82 MMHG

## 2019-10-02 DIAGNOSIS — I10 ESSENTIAL HYPERTENSION: ICD-10-CM

## 2019-10-02 DIAGNOSIS — I50.32 CHRONIC DIASTOLIC (CONGESTIVE) HEART FAILURE (HCC): Primary | ICD-10-CM

## 2019-10-02 DIAGNOSIS — I50.32 CHRONIC DIASTOLIC (CONGESTIVE) HEART FAILURE (HCC): ICD-10-CM

## 2019-10-02 PROCEDURE — 36415 COLL VENOUS BLD VENIPUNCTURE: CPT

## 2019-10-02 PROCEDURE — 80053 COMPREHEN METABOLIC PANEL: CPT | Performed by: INTERNAL MEDICINE

## 2019-10-02 PROCEDURE — 93000 ELECTROCARDIOGRAM COMPLETE: CPT | Performed by: INTERNAL MEDICINE

## 2019-10-02 PROCEDURE — 99204 OFFICE O/P NEW MOD 45 MIN: CPT | Performed by: INTERNAL MEDICINE

## 2019-10-02 PROCEDURE — 83880 ASSAY OF NATRIURETIC PEPTIDE: CPT | Performed by: INTERNAL MEDICINE

## 2019-10-02 PROCEDURE — 84443 ASSAY THYROID STIM HORMONE: CPT | Performed by: INTERNAL MEDICINE

## 2019-10-02 RX ORDER — MEMANTINE HYDROCHLORIDE 10 MG/1
10 TABLET ORAL 2 TIMES DAILY
COMMUNITY

## 2019-10-02 RX ORDER — CARVEDILOL 6.25 MG/1
6.25 TABLET ORAL 2 TIMES DAILY WITH MEALS
COMMUNITY

## 2019-10-02 RX ORDER — TAMOXIFEN CITRATE 20 MG/1
20 TABLET ORAL DAILY
COMMUNITY

## 2019-10-02 RX ORDER — POTASSIUM CHLORIDE 20 MEQ/1
20 TABLET, EXTENDED RELEASE ORAL DAILY
COMMUNITY

## 2019-10-02 NOTE — PROGRESS NOTES
[facility] - CARDIOLOGY  New Patient Initial Outpatient Evaulation    Primary Care Physician: Francis Moya MD    Subjective     Chief Complaint: ?CHF    History of Present Illness    78 year old referred for possible congestive heart failure. She was admitted Jan '19 and April '19 for pneumonia, and has followed with Dr. Edwards since then. She reports getting an echocardiogram in May or June, and states Dr. Moya has told her she's had congestive heart failure for years.  He gave her 40mg IM lasix in the office in August. Currently, she reports her leg swelling improved after this. She reports no real breathing problems of late. No orthopnea. Has had some PND and orthopnea sparingly in past.  Leg swelling is slightly improved. Her only exertional dyspnea is when going up stairs; does not stop her.  Recovers quickly.  Is not on any oral diuretic.    Review of Systems   Constitution: Positive for malaise/fatigue.   HENT: Negative.  Negative for nosebleeds.    Eyes: Negative.    Cardiovascular: Positive for dyspnea on exertion and leg swelling. Negative for chest pain, claudication, irregular heartbeat, near-syncope, orthopnea, palpitations, paroxysmal nocturnal dyspnea and syncope.   Respiratory: Negative.  Negative for cough, shortness of breath and wheezing.    Endocrine: Negative.    Hematologic/Lymphatic: Negative.  Negative for bleeding problem. Does not bruise/bleed easily.   Skin: Negative.    Musculoskeletal: Negative.    Gastrointestinal: Negative.  Negative for dysphagia, hematemesis, hematochezia and melena.   Genitourinary: Negative.  Negative for hematuria and non-menstrual bleeding.   Neurological: Negative.    Psychiatric/Behavioral: Negative.    Allergic/Immunologic: Negative.         Otherwise complete ROS reviewed and negative except as mentioned in the HPI.      Past Medical History:   Past Medical History:   Diagnosis Date   • Breast cancer (CMS/HCC)    • CHF (congestive heart failure)  (CMS/HCC)    • Coronary artery disease    • Hyperlipidemia    • Hypertension    • Stroke (CMS/HCC)        Past Surgical History:  Past Surgical History:   Procedure Laterality Date   • BREAST LUMPECTOMY     • CARPAL TUNNEL RELEASE     •  SECTION     • ENDOSCOPY         Family History: family history includes Arrhythmia in her mother; Cancer in her father.    Social History:  reports that she has never smoked. She has never used smokeless tobacco. She reports that she does not drink alcohol or use drugs.    Medications:  Prior to Admission medications    Medication Sig Start Date End Date Taking? Authorizing Provider   aspirin 81 MG chewable tablet Chew 81 mg Daily.    Janneth Mcneil MD   atorvastatin (LIPITOR) 80 MG tablet Take 80 mg by mouth Daily.    Janneth Mcneil MD   calcium carbonate (OS-BALJEET) 600 MG tablet Take 600 mg by mouth Daily.    Janneth Mcneil MD   diphenhydrAMINE (BENADRYL) 25 mg capsule Take 25 mg by mouth Every 6 (Six) Hours As Needed for Itching.    Janneth Mcneil MD   docusate sodium (COLACE) 100 MG capsule Take 100 mg by mouth 2 (Two) Times a Day.    Janneth Mcneil MD   gabapentin (NEURONTIN) 100 MG capsule Take 100 mg by mouth Every Night.    Janneth Mcneil MD   metoprolol tartrate (LOPRESSOR) 25 MG tablet Take 25 mg by mouth 2 (Two) Times a Day.    Janneth Mcneil MD   montelukast (SINGULAIR) 10 MG tablet Take 10 mg by mouth Every Night.    Janneth Mcneil MD   pantoprazole (PROTONIX) 40 MG EC tablet Take 40 mg by mouth Daily.    Janneth Mcneil MD   potassium chloride (K-DUR,KLOR-CON) 10 MEQ CR tablet Take 10 mEq by mouth 2 (Two) Times a Day.    Janneth Mcneil MD   spironolactone-hydrochlorothiazide (ALDACTAZIDE) 25-25 MG tablet Take 1 tablet by mouth Daily.    Janneth Mcneil MD     Allergies:  No Known Allergies    Objective     Vital Signs: /82 (BP Location: Right arm, Patient Position: Sitting)   Pulse  "60   Ht 167.6 cm (66\")   Wt 55.8 kg (123 lb)   BMI 19.85 kg/m²     Physical Exam   Constitutional: No distress.   HENT:   Mouth/Throat: Oropharynx is clear. Pharynx is normal.   Neck: Normal range of motion and thyroid normal. Neck supple. No JVD present. No thyromegaly present.   Cardiovascular: Normal rate, regular rhythm, S1 normal, S2 normal, normal heart sounds, intact distal pulses and normal pulses.  No extrasystoles are present. PMI is not displaced.   No murmur heard.  Pulmonary/Chest: Effort normal and breath sounds normal.   Abdominal: Soft. Bowel sounds are normal. She exhibits no distension. There is no splenomegaly or hepatomegaly. There is no tenderness.   Musculoskeletal: She exhibits no edema or tenderness.   Neurological: She is alert and oriented to person, place, and time.   Skin: Skin is warm and dry.       Results Reviewed:      ECG 12 Lead  Date/Time: 10/2/2019 2:27 PM  Performed by: Carl Beck MD  Authorized by: Carl Beck MD   Comparison: not compared with previous ECG   Previous ECG: no previous ECG available  Rhythm: sinus rhythm  Rate: normal  QRS axis: normal    Clinical impression: normal ECG                  Assessment / Plan        Problem List Items Addressed This Visit        Cardiovascular and Mediastinum    Chronic diastolic (congestive) heart failure (CMS/HCC) - Primary    Relevant Medications    carvedilol (COREG) 6.25 MG tablet    Other Relevant Orders    Comprehensive Metabolic Panel (Completed)    TSH (Completed)    BNP (Completed)    Hypertension    Relevant Medications    carvedilol (COREG) 6.25 MG tablet        Recommendations and plans: It is difficult to say at this point if she has had diastolic heart failure in the past, or heart failure of any variety.  We will check labs today, though she certainly does not appear volume overloaded on exam.  I need to obtain the echocardiogram performed at [facility].  I will review any other available records in " "May showed light on the possibility of this diagnosis.    For now, I advised the patient to continue to weigh herself daily, and if she does not notice abrupt changes in her weight with accompanying worsening of her breathing and leg swelling, then she would not need any diuretics.  If she does start to notice rapid weight gains with the symptoms, asked her to call us and I will be happy to give her a prescription for \"PRN\" usage of Lasix.    F/u 3 months    Carl Beck MD   10/03/19   11:24 PM  "

## 2019-10-03 PROBLEM — I50.32 CHRONIC DIASTOLIC (CONGESTIVE) HEART FAILURE (HCC): Status: ACTIVE | Noted: 2019-10-03

## 2019-10-03 PROBLEM — I10 HYPERTENSION: Status: ACTIVE | Noted: 2019-10-03

## 2019-10-03 LAB
ALBUMIN SERPL-MCNC: 4.1 G/DL (ref 3.5–5.2)
ALBUMIN/GLOB SERPL: 1.8 G/DL
ALP SERPL-CCNC: 43 U/L (ref 39–117)
ALT SERPL W P-5'-P-CCNC: 10 U/L (ref 1–33)
ANION GAP SERPL CALCULATED.3IONS-SCNC: 11.3 MMOL/L (ref 5–15)
AST SERPL-CCNC: 18 U/L (ref 1–32)
BILIRUB SERPL-MCNC: 0.6 MG/DL (ref 0.2–1.2)
BUN BLD-MCNC: 12 MG/DL (ref 8–23)
BUN/CREAT SERPL: 16.4 (ref 7–25)
CALCIUM SPEC-SCNC: 9.2 MG/DL (ref 8.6–10.5)
CHLORIDE SERPL-SCNC: 102 MMOL/L (ref 98–107)
CO2 SERPL-SCNC: 27.7 MMOL/L (ref 22–29)
CREAT BLD-MCNC: 0.73 MG/DL (ref 0.57–1)
GFR SERPL CREATININE-BSD FRML MDRD: 77 ML/MIN/1.73
GLOBULIN UR ELPH-MCNC: 2.3 GM/DL
GLUCOSE BLD-MCNC: 95 MG/DL (ref 65–99)
NT-PROBNP SERPL-MCNC: 270.7 PG/ML (ref 5–1800)
POTASSIUM BLD-SCNC: 4.1 MMOL/L (ref 3.5–5.2)
PROT SERPL-MCNC: 6.4 G/DL (ref 6–8.5)
SODIUM BLD-SCNC: 141 MMOL/L (ref 136–145)
TSH SERPL DL<=0.05 MIU/L-ACNC: 1.07 UIU/ML (ref 0.27–4.2)

## 2019-10-04 ENCOUNTER — TELEPHONE (OUTPATIENT)
Dept: CARDIOLOGY | Facility: CLINIC | Age: 79
End: 2019-10-04

## 2019-10-04 NOTE — TELEPHONE ENCOUNTER
Tried to contact [facility] could not get anyone to answer, will call again.

## 2019-10-04 NOTE — TELEPHONE ENCOUNTER
----- Message from Carl Beck MD sent at 10/3/2019 11:23 PM CDT -----  I do not remember if we already talked about this yesterday or not, so if this is repeating some request I apologize.  Patient says that she was admitted at [facility] and has had an echocardiogram done there sometime earlier this year.  We need to obtain the report for now, and ultimately we can get the disc of images that would be even better.  Thanks

## 2019-10-07 NOTE — TELEPHONE ENCOUNTER
[facility] will be faxing records of Echo and will mailing disc of echo as well.

## 2019-10-18 ENCOUNTER — OFFICE VISIT (OUTPATIENT)
Dept: OTOLARYNGOLOGY | Facility: CLINIC | Age: 79
End: 2019-10-18

## 2019-10-18 VITALS
BODY MASS INDEX: 19.96 KG/M2 | DIASTOLIC BLOOD PRESSURE: 96 MMHG | HEIGHT: 66 IN | SYSTOLIC BLOOD PRESSURE: 171 MMHG | TEMPERATURE: 98.6 F | WEIGHT: 124.2 LBS | HEART RATE: 59 BPM

## 2019-10-18 DIAGNOSIS — K21.9 GASTROESOPHAGEAL REFLUX DISEASE, ESOPHAGITIS PRESENCE NOT SPECIFIED: Primary | ICD-10-CM

## 2019-10-18 DIAGNOSIS — K21.9 LARYNGOPHARYNGEAL REFLUX (LPR): ICD-10-CM

## 2019-10-18 PROCEDURE — 31575 DIAGNOSTIC LARYNGOSCOPY: CPT | Performed by: PHYSICIAN ASSISTANT

## 2019-10-18 PROCEDURE — 99202 OFFICE O/P NEW SF 15 MIN: CPT | Performed by: PHYSICIAN ASSISTANT

## 2019-10-18 RX ORDER — FAMOTIDINE 40 MG/1
40 TABLET, FILM COATED ORAL NIGHTLY
Qty: 30 TABLET | Refills: 11 | Status: SHIPPED | OUTPATIENT
Start: 2019-10-18 | End: 2020-08-06

## 2019-10-18 NOTE — PROGRESS NOTES
BAY Yost     Chief Complaint   Patient presents with   • Hoarse     6 months        HISTORY OF PRESENT ILLNESS:     Coreen Wilson is a  78 y.o.  female who complains of hoarseness. The symptoms are not localized to a particular location. The patient has had moderate symptoms. The symptoms have been relatively constant for the last 6 months. The symptoms are aggravated by  no identifiable factors. The symptoms are improved by no identifiable factors.    Review of Systems   Constitutional: Negative for activity change, appetite change, chills, diaphoresis, fatigue, fever and unexpected weight change.   HENT: Positive for voice change. Negative for congestion, dental problem, drooling, ear discharge, ear pain, facial swelling, hearing loss, mouth sores, nosebleeds, postnasal drip, rhinorrhea, sinus pressure, sneezing, sore throat, tinnitus and trouble swallowing.    Eyes: Negative.    Respiratory: Negative.    Cardiovascular: Negative.    Gastrointestinal: Negative.         GERD   Endocrine: Negative.    Skin: Negative.    Allergic/Immunologic: Negative for environmental allergies, food allergies and immunocompromised state.   Neurological: Negative.    Hematological: Negative.    Psychiatric/Behavioral: Negative.    :    Past History:  Past Medical History:   Diagnosis Date   • Breast cancer (CMS/HCC)    • CHF (congestive heart failure) (CMS/HCC)    • Coronary artery disease    • Hyperlipidemia    • Hypertension    • Stroke (CMS/HCC)      Past Surgical History:   Procedure Laterality Date   • BREAST LUMPECTOMY     • CARPAL TUNNEL RELEASE     •  SECTION     • ENDOSCOPY       Family History   Problem Relation Age of Onset   • Arrhythmia Mother    • Cancer Father      Social History     Tobacco Use   • Smoking status: Never Smoker   • Smokeless tobacco: Never Used   Substance Use Topics   • Alcohol use: No   • Drug use: No     Outpatient Medications Marked as Taking for the 10/18/19 encounter  [facility] hospice nurse El Walker RN confirms that pt will be admitted to [facility] room 108 via [facility].        Guillermina Floyd RN  07/15/19 3176 (Office Visit) with Jose Fuller PA   Medication Sig Dispense Refill   • aspirin 81 MG chewable tablet Chew 81 mg Daily.     • atorvastatin (LIPITOR) 80 MG tablet Take 80 mg by mouth Daily.     • BIOTIN PO Take 10,000 mcg by mouth.     • calcium carbonate (OS-BALJEET) 600 MG tablet Take 600 mg by mouth Daily.     • carvedilol (COREG) 6.25 MG tablet Take 6.25 mg by mouth 2 (Two) Times a Day With Meals.     • Cholecalciferol (VITAMIN D3) 5000 units capsule capsule Take 5,000 Units by mouth Daily.     • diphenhydrAMINE (BENADRYL) 25 mg capsule Take 25 mg by mouth Every 6 (Six) Hours As Needed for Itching.     • docusate sodium (COLACE) 100 MG capsule Take 100 mg by mouth 2 (Two) Times a Day.     • gabapentin (NEURONTIN) 100 MG capsule Take 100 mg by mouth Every Night.     • memantine (NAMENDA) 10 MG tablet Take 10 mg by mouth 2 (Two) Times a Day.     • montelukast (SINGULAIR) 10 MG tablet Take 10 mg by mouth Every Night.     • pantoprazole (PROTONIX) 40 MG EC tablet Take 40 mg by mouth 2 (Two) Times a Day.     • potassium chloride (K-DUR,KLOR-CON) 20 MEQ CR tablet Take 20 mEq by mouth Daily.     • spironolactone-hydrochlorothiazide (ALDACTAZIDE) 25-25 MG tablet Take 1 tablet by mouth Daily.     • tamoxifen (NOLVADEX) 20 MG chemo tablet Take 20 mg by mouth Daily.       Allergies:  Patient has no known allergies.          Vital Signs:   Vitals:    10/18/19 1026   BP: 171/96   Pulse: 59   Temp: 98.6 °F (37 °C)         EXAMINATION:   CONSTITUTIONAL: well nourished, alert, oriented, in no acute distress     COMMUNICATION AND VOICE: able to communicate normally, normal voice quality    HEAD: normocephalic, no lesions, atraumatic, no tenderness, no masses     FACE: appearance normal, no lesions, no tenderness, no deformities, facial motion symmetric    SALIVARY GLANDS: parotid glands with no tenderness, no swelling, no masses, submandibular glands with normal size, nontender    EYES: ocular motility normal, eyelids  normal, orbits normal, no proptosis, conjunctiva normal , pupils equal, round     EARS:  Hearing: response to conversational voice normal bilaterally   External Ears: auricles without lesions  Otoscopic: tympanic membrane appearance normal, no lesions, no perforation, normal mobility, no fluid    NOSE:  External Nose: structure normal, no tenderness on palpation, no nasal discharge, no lesions, no evidence of trauma, nostrils patent   Intranasal Exam: nasal mucosa normal, vestibule within normal limits, inferior turbinate normal, nasal septum midline     ORAL:  Lips: upper and lower lips without lesion   Teeth: dentition within normal limits for age   Gums: gingivae healthy   Oral Mucosa: oral mucosa normal, no mucosal lesions   Floor of Mouth: Warthin’s duct patent, mucosa normal  Tongue: lingual mucosa normal without lesions, normal tongue mobility   Palate: soft and hard palates with normal mucosa and structure  Oropharynx: oropharyngeal mucosa erythema    NECK: neck appearance normal, no mass,  noted without erythema or tenderness    THYROID: no overt thyromegaly, no tenderness, nodules or mass present on palpation, position midline     LYMPH NODES: no lymphadenopathy    CHEST/RESPIRATORY: respiratory effort normal, normal breath sounds     CARDIOVASCULAR: rate and rhythm normal, extremities without cyanosis or edema      NEUROLOGIC/PSYCHIATRIC: oriented to time, place and person, mood normal, affect appropriate, CN II-XII intact grossly    OPERATIVE NOTE:  Coreen Wilson    DATE OF PROCEDURE: 10/18/2019    PROCEDURE:   Flexible Fiberoptic Laryngoscopy    ANESTHESIA:  None    REASON FOR PROCEDURE:  Procedure was recommend for persistant hoarseness for the past six months  Risks, benefits and alternatives were discussed.      DETAILS of OPERATION:  The patient was seated in the exam chair.  A flexible fiberoptic laryngoscopy was performed through the oral cavity.  The scope was introduced into the oral cavity and  directed to the level of the glottis, examining the structures of the oropharynx, base of tongue, vallecula, supraglottic larynx, glottic larynx, and hypopharynx.      PRE-OPERATIVE DIAGNOSIS:  GERD/LPR    POST-OPERATIVE DIAGNOSIS:  Same    FINDINGS:  Mucosal surfaces:   The mucosal surfaces demonstrated normal mucosa surfaces with moderate inflammation    Base of tongue:  The base of tongue was found to have lymphoid hyperplasia, moderate.    Epiglottis:  The epiglottis was found to have erythema and edema.    Aryepligottic fold:  The AE folds were found to have no mass or lesion.    False Vocal Fold:  The false cords were found to have no mass or lesion.    True Vocal Cord:  The true vocal cords were found to have no mass or lesion. Normal bilateral adduction and abduction.    Arytenoid:   The arytenoids were found to have mild to moderate inter-arytenoid edema with erythema. Arytenoid bar formation.    Hypopharynx:  The hypopharynx was found to have no mass or lesion.    The patient tolerated procedure well.          RESULTS REVIEW:    I have reviewed the patients old records in the chart.       Assessment    Diagnosis Plan   1. Gastroesophageal reflux disease, esophagitis presence not specified     2. Laryngopharyngeal reflux (LPR)         Plan    Patient Instructions   Eliminate dairy especially in the evening. No eating 2 hours before bed, elevate the head of the bed, and eat a fresh food diet.    Continue Protonix in the morning and will start Pepcid before bed.    ALL ABOUT HEARTBURN AND THE LIFESTYLE CHANGES THAT HELP    Lifestyle Changes Can Help Relieve The Burning Pain In Your Tummy    What is Heartburn?  Heartburn occurs when the lining of the esophagus (the tube that connects the mouth to the stomach) is exposed to and irritated by stomach acid.  Heartburn often feels like a burning pain in the middle of your chest that moves up your throat.  You may also have the sensation that food has come back into  your mouth, leaving a sour or bitter taste.  Almost everyone has heartburn once in a while.  But if you have heartburn 2 or more times per week, it can be a sign of a more serious problem call gastroesophogeal reflux disease, or GERD.    What causes Heartburn?  Heartburn usually occurs when the valve at the rojas of the stomach (the lower esophageal sphincter or LES) doesn’t close the way it should.  If the LES is weak or opens at the wrong time, stomach acid can reflux (flow back) into the esophagus and cause heartburn.  Factors that can affect the LES include:    • Eating or drinking certain foods and beverages such as chocolate, peppermint, fried or fatty foods, coffee, or drinks that contain alcohol  • Having a hiatal hernia - a common physical condition where part of the stomach protrudes up through the diaphragm  • Lying down  • Smoking cigarettes  • Taking certain medications (be sure to tell your doctor about all medications or supplements you take)    What foods can make heartburn worse?  All foods cause the stomach to produce acid, although foods can affect people in different ways.  Following is a list of foods and beverages that may aggravate your symptoms.  You may want to eat them less often.    • Fried or fatty foods  • Heavy seasoning and spicy foods  • Coffee  • Onions  • Orange juice, grapefruit juice, and tomato juice  • Alcoholic beverages  • Chocolate  • Peppermint and spearmint    What else can I do to help control my heartburn?  Try these lifestyle changes:  • Stop Smoking  • Lose weight - if you’re overweight  • Exercise to help control your weight (talk to your doctor first before starting any exercise program).  • Eat small, well-balanced meals  • Reduce abdominal pressure-don’t wear tight belts or tight clothing  • Avoid eating within 2 hours before bedtime  • Elevate your bed so the head is 6 to 8 inches higher than the foot.  This can help reduce acid reflux at night  • Let your doctor  know about all the drugs and supplements you are taking.  Some of them may contribute to your heartburn.    What if these things don’t work?  Talk to your doctor, who can discuss many treatments with you.  Although there are several possible causes of heartburn associated with GERD, they all involve stomach acids.  So no matter what the cause may be, the medicines to reduce stomach acid can prevent heartburn.        New Medications Ordered This Visit   Medications   • famotidine (PEPCID) 40 MG tablet     Sig: Take 1 tablet by mouth Every Night.     Dispense:  30 tablet     Refill:  11             Return in about 3 months (around 1/18/2020) for Recheck throat.    BAY Yost  10/18/19  10:56 AM

## 2019-10-18 NOTE — PATIENT INSTRUCTIONS
Eliminate dairy especially in the evening. No eating 2 hours before bed, elevate the head of the bed, and eat a fresh food diet.    Continue Protonix in the morning and will start Pepcid before bed.    ALL ABOUT HEARTBURN AND THE LIFESTYLE CHANGES THAT HELP    Lifestyle Changes Can Help Relieve The Burning Pain In Your Tummy    What is Heartburn?  Heartburn occurs when the lining of the esophagus (the tube that connects the mouth to the stomach) is exposed to and irritated by stomach acid.  Heartburn often feels like a burning pain in the middle of your chest that moves up your throat.  You may also have the sensation that food has come back into your mouth, leaving a sour or bitter taste.  Almost everyone has heartburn once in a while.  But if you have heartburn 2 or more times per week, it can be a sign of a more serious problem call gastroesophogeal reflux disease, or GERD.    What causes Heartburn?  Heartburn usually occurs when the valve at the rojas of the stomach (the lower esophageal sphincter or LES) doesn’t close the way it should.  If the LES is weak or opens at the wrong time, stomach acid can reflux (flow back) into the esophagus and cause heartburn.  Factors that can affect the LES include:    • Eating or drinking certain foods and beverages such as chocolate, peppermint, fried or fatty foods, coffee, or drinks that contain alcohol  • Having a hiatal hernia - a common physical condition where part of the stomach protrudes up through the diaphragm  • Lying down  • Smoking cigarettes  • Taking certain medications (be sure to tell your doctor about all medications or supplements you take)    What foods can make heartburn worse?  All foods cause the stomach to produce acid, although foods can affect people in different ways.  Following is a list of foods and beverages that may aggravate your symptoms.  You may want to eat them less often.    • Fried or fatty foods  • Heavy seasoning and spicy  foods  • Coffee  • Onions  • Orange juice, grapefruit juice, and tomato juice  • Alcoholic beverages  • Chocolate  • Peppermint and spearmint    What else can I do to help control my heartburn?  Try these lifestyle changes:  • Stop Smoking  • Lose weight - if you’re overweight  • Exercise to help control your weight (talk to your doctor first before starting any exercise program).  • Eat small, well-balanced meals  • Reduce abdominal pressure-don’t wear tight belts or tight clothing  • Avoid eating within 2 hours before bedtime  • Elevate your bed so the head is 6 to 8 inches higher than the foot.  This can help reduce acid reflux at night  • Let your doctor know about all the drugs and supplements you are taking.  Some of them may contribute to your heartburn.    What if these things don’t work?  Talk to your doctor, who can discuss many treatments with you.  Although there are several possible causes of heartburn associated with GERD, they all involve stomach acids.  So no matter what the cause may be, the medicines to reduce stomach acid can prevent heartburn.

## 2019-10-22 ENCOUNTER — TELEPHONE (OUTPATIENT)
Dept: SURGERY | Age: 79
End: 2019-10-22

## 2019-11-23 ENCOUNTER — DOCUMENTATION (OUTPATIENT)
Dept: CARDIOLOGY | Facility: CLINIC | Age: 79
End: 2019-11-23

## 2019-11-23 NOTE — PROGRESS NOTES
We received, and I reviewed the images from the echocardiogram performed at [facility] [place]/19.  My interpretation:   -nl LV size and function (EF >55%)  -nl LV diastolic function  -normal size and function of RV  -no valvular pathology

## 2019-12-17 ENCOUNTER — TELEPHONE (OUTPATIENT)
Dept: SURGERY | Age: 79
End: 2019-12-17

## 2020-01-27 ENCOUNTER — OFFICE VISIT (OUTPATIENT)
Dept: SURGERY | Age: 80
End: 2020-01-27
Payer: MEDICARE

## 2020-01-27 ENCOUNTER — HOSPITAL ENCOUNTER (OUTPATIENT)
Dept: WOMENS IMAGING | Age: 80
Discharge: HOME | End: 2020-01-27
Payer: MEDICARE

## 2020-01-27 VITALS — HEART RATE: 72 BPM | SYSTOLIC BLOOD PRESSURE: 132 MMHG | DIASTOLIC BLOOD PRESSURE: 70 MMHG

## 2020-01-27 PROCEDURE — G8399 PT W/DXA RESULTS DOCUMENT: HCPCS | Performed by: SURGERY

## 2020-01-27 PROCEDURE — G8427 DOCREV CUR MEDS BY ELIG CLIN: HCPCS | Performed by: SURGERY

## 2020-01-27 PROCEDURE — G8484 FLU IMMUNIZE NO ADMIN: HCPCS | Performed by: SURGERY

## 2020-01-27 PROCEDURE — 99213 OFFICE O/P EST LOW 20 MIN: CPT | Performed by: SURGERY

## 2020-01-27 PROCEDURE — G0279 TOMOSYNTHESIS, MAMMO: HCPCS

## 2020-01-27 PROCEDURE — G8420 CALC BMI NORM PARAMETERS: HCPCS | Performed by: SURGERY

## 2020-01-27 PROCEDURE — 4040F PNEUMOC VAC/ADMIN/RCVD: CPT | Performed by: SURGERY

## 2020-01-27 PROCEDURE — 1090F PRES/ABSN URINE INCON ASSESS: CPT | Performed by: SURGERY

## 2020-01-27 PROCEDURE — 1123F ACP DISCUSS/DSCN MKR DOCD: CPT | Performed by: SURGERY

## 2020-01-27 PROCEDURE — 1036F TOBACCO NON-USER: CPT | Performed by: SURGERY

## 2020-01-27 NOTE — PROGRESS NOTES
appropriate post operative changes. There are no dominant masses, no skin or nipple changes, and no axillary adenopathy. There is nothing suspicious for new carcinoma and no evidence of local tumor recurrence. .    IMPRESSION:    Doing well s/p left lumpectomy 2/6/2018    PLAN:  I will see her back in 6 months for physical exam. She will call if anything changes. I have seen, examined and reviewed this patient medication list, appropriate labs and imaging studies. I reviewed relevant medical records and others physicians notes. I discussed the plans of care with the patient. I answered all the questions to the patients satisfaction. I, Dr Emma Phelps, personally performed the services described in this documentation as scribed by Rosa Garrett MA in my presence and is both accurate and complete. (Please note that portions of this note were completed with a voice recognition program. Efforts were made to edit the dictations but occasionally words are mis-transcribed.)  Over 50% of the total visit time of 20 minutes in face to face encounter with the patient, out of which more than 50% of the time was spent in counseling patient or family and coordination of care. Counseling included but was not limited to time spent reviewing labs, imaging studies/ treatment plan and answering questions.

## 2020-02-07 NOTE — PROGRESS NOTES
Damaris Norberto   1940  2/10/2020     Chief Complaint   Patient presents with    Breast Cancer     Malignant neoplasm of central portion of left breast in female, estrogen receptor positive      INTERVAL HISTORY/HISTORY OF PRESENT ILLNESS:  Diagnosis   Multifocal invasive ductal carcinoma. December 2017   %, MT 99%, HER-2/mary IHC 1+/FISH not amplified   Grade 1   Stage [id number] , stage IA. AJCC 2018   Normal bone density, March 2018  Treatment summary  2 6018-Left lumpectomy/sentinel lymph node biopsy   4/24/2018 through 5/15/2018- 4256 cGy adjuvant RT   June 2018- adjuvant Femara ×5 years   September 2018-switched to tamoxifen due to poor tolerance to AI (severe joint pain). Interval history  She is status post adjuvant radiation. She has started Femara, but could not tolerate and was switched to tamoxifen September 2018. She has been tolerate tamoxifen well, but was concerned about prior history of stroke, and therefore, she quit a few weeks ago. He comes today to discuss alternatives. Cancer history  Ms Ashley Cronin was first seen by me on 3/7/2018 referred by Dr. Nelly Schultz. 12/8/2017-a screening 3-D amada synthesis showed a 5 mm spiculated mass in the left breast. Diagnostic ultrasound was unremarkable. 1/3/2018-core needle biopsy revealed 2 foci consistent with invasive ductal carcinoma, grade 1 with associated low-grade DCIS, cribriform type. At 12 o'clock position. 100%, MT 99%, HER-2/mary IHC 1+/FISH not amplified, Ki-67 7%.   1/18/2018-MRI of the breast showed 2 biopsy sites in the left breast at 12 o'clock position. No pathologic axillary or internal mammary lymphadenopathy. No evidence of contralateral malignancy in the right breast.   2/6/2018-she underwent a left lumpectomy/sentinel lymph node biopsy revealing a multifocal invasive ductal low-grade adenocarcinoma with 2 foci of tumor. Largest foci measuring 6 mm. Tumor, 5 mm from the closest lateral margin of excision. Margins negative. 3 sentinel lymph nodes negative for metastatic disease. 3/7/2018-she was first seen by me. Recommended adjuvant endocrine therapy with femara. 3/13/2018-normal bone density. 2018 through 5/15/2018- 4256 cGy adjuvant RT   2018- adjuvant Femara ×5 years. 2020 [facility] Bilateral- History of left breast cancer and lumpectomy. No malignant features. PAST MEDICAL HISTORY:   Past Medical History:   Diagnosis Date    [facility]     Cancer ([facility]     breast    GERD (gastroesophageal reflux disease)     Hyperlipidemia     Hypertension     Personal history of transient cerebral ischemia     Pneumonia     patient diagnosed and in hospital  through 2019    S/P lumpectomy, left breast 2018    Stroke ([facility]           PAST SURGICAL HISTORY:  Past Surgical History:   Procedure Laterality Date     SECTION      COLONOSCOPY      CYST REMOVAL Left     Left breast     VT MASTECTOMY, PARTIAL Left 2018    LUMPECTOMY WITH SNB WITH PREOP US AND INTRAOP US GUIDED NEEDLE LOCALIZATION X2 performed by Avelina Cai MD at [facility]          SOCIAL HISTORY:  Social History     Socioeconomic History    Marital status:       Spouse name: None    Number of children: None    Years of education: None    Highest education level: None   Occupational History    None   Social Needs    Financial resource strain: None    Food insecurity:     Worry: None     Inability: None    Transportation needs:     Medical: None     Non-medical: None   Tobacco Use    Smoking status: Never Smoker    Smokeless tobacco: Never Used   Substance and Sexual Activity    Alcohol use: No    Drug use: No    Sexual activity: None   Lifestyle    Physical activity:     Days per week: None     Minutes per session: None    Stress: None   Relationships    Social connections:     Talks on phone: None     Gets together: None     Attends Scientologist service: None     Active member of club or organization: None     Attends meetings of clubs or organizations: None     Relationship status: None    Intimate partner violence:     Fear of current or ex partner: None     Emotionally abused: None     Physically abused: None     Forced sexual activity: None   Other Topics Concern    None   Social History Narrative    None       FAMILY HISTORY:  Family History   Problem Relation Age of Onset    Lung Cancer Father 64    High Blood Pressure Mother         Current Outpatient Medications   Medication Sig Dispense Refill    Cholecalciferol (VITAMIN D3) 88603 units CAPS Take by mouth      memantine (NAMENDA) 10 MG tablet Take 10 mg by mouth 2 times daily      spironolactone (ALDACTONE) 25 MG tablet Take 25 mg by mouth daily      tamoxifen (NOLVADEX) 10 MG tablet Take 10 mg by mouth daily      gabapentin (NEURONTIN) 100 MG capsule Take 100 mg by mouth nightly.  montelukast (SINGULAIR) 10 MG tablet Take 1 tablet by mouth daily 90 tablet 3    carvedilol (COREG) 3.125 MG tablet 3.125 mg 2 times daily       letrozole (FEMARA) 2.5 MG tablet       DiphenhydrAMINE HCl (BENADRYL ALLERGY PO) Take by mouth      Docusate Sodium (COLACE PO) Take by mouth      metoprolol succinate (TOPROL XL) 25 MG extended release tablet Take 25 mg by mouth daily      ASPIR-LOW 81 MG EC tablet Take 81 mg by mouth daily       ALDACTAZIDE 25-25 MG per tablet Take 1 tablet by mouth daily       atorvastatin (LIPITOR) 80 MG tablet Take 80 mg by mouth daily       pantoprazole (PROTONIX) 40 MG tablet Take 40 mg by mouth daily       calcium carbonate-vitamin D (CALTRATE) 600-400 MG-UNIT TABS per tab Take 1 tablet by mouth daily       KLOR-CON 10 10 MEQ extended release tablet Take 10 mEq by mouth daily        No current facility-administered medications for this visit.          REVIEW OF SYSTEMS:    Constitutional: no fever, no night sweats, fatigue; HEENT: no blurring of vision, no double vision, no hearing difficulty, no tinnitus,no ulceration, no dysphagia  Lungs: no cough, no shortness of breath, no wheeze;   CVS: no palpitation, no chest pain, no shortness of breath;  GI: no abdominal pain, no nausea , no vomiting, no constipation;   ENEDELIA: no dysuria, frequency and urgency, no hematuria, no kidney stones;   Musculoskeletal: no joint pain, swelling , stiffness;   Endocrine: no polyuria, polydypsia, no cold or heat intolerence; Hematology/lymphatic: no easy brusing or bleeding, no hx of clotting disorder; no peripheral adenopathy. Dermatology: no skin rash, no eczema, no pruritis;   Psychiatry: no depression, no anxiety,no panic attacks, no suicide ideation; Neurology: no syncope, no seizures, no numbness or tingling of hands, no numbness or tingling of feet, no paresis;     PHYSICAL EXAM:    Vitals signs:  BP (!) 144/72   Pulse 60   Ht 5' 6\" (1.676 m)   Wt 128 lb 14.4 oz (58.5 kg)   SpO2 97%   BMI 20.81 kg/m²    Pain scale:  Pain Score:   0 - No pain     CONSTITUTIONAL: Alert, appropriate, no acute distress,   EYES: Non icteric, EOM intact, pupils equal round and reactive to light and accommodation. ENT: Oral mucus membranes moist, no oral pharyngeal lesions. External inspection of ears and nose are normal.   NECK: Supple, no masses. No palpable thyroid mass    CHEST/LUNGS: CTA bilaterally, normal respiratory effort   CARDIOVASCULAR: RRR, no murmurs. No lower extremity edema   ABDOMEN: soft non-tender, active bowel sounds, no hepatosplenomegaly. No palpable masses. EXTREMITIES: warm, Full ROM of all fours extremities. No focal weakness. SKIN: warm, dry with no rashes or lesions  LYMPH: No cervical, clavicular, axillary, or inguinal lymphadenopathy  NEUROLOGIC: follows commands, non focal.   PSYCH: mood and affect appropriate. Alert and oriented to time and place and person.     Relevant Lab findings/reviewed by decision. She is to continue aspirin. Bone health-normal bone density March 2018. Plan:  RTC 1 year  Continue tamoxifen 5 mg daily   Continue aspirin  I have seen, examined and reviewed this patient medication list, appropriate labs and imaging studies. I reviewed relevant medical records and others physicians notes. I discussed the plans of care with the patient. I answered all the questions to the patients satisfaction  (Please note that portions of this note were completed with a voice recognition program. Efforts were made to edit the dictations but occasionally words are mis-transcribed    Follow Up:     Return in about 1 year (around 2/10/2021) for an appt with Dr. Padmaja Hernandez. Data Eleuterio Del Rio am scribing for Марина Berry MD. Electronically signed by Luke Del Rio on 2/10/2020 at 3:42 PM.     I, Dr Nunu Ayala, personally performed the services described in this documentation as scribed by Luke Del Rio MA in my presence and is both accurate and complete. Over 50% of the total visit time of 15 minutes in face to face encounter with the patient, out of which more than 50% of the time was spent in counseling patient or family and coordination of care. Counseling included but was not limited to time spent reviewing labs, imaging studies/ treatment plan and answering questions.

## 2020-02-10 ENCOUNTER — OFFICE VISIT (OUTPATIENT)
Dept: HEMATOLOGY | Age: 80
End: 2020-02-10
Payer: MEDICARE

## 2020-02-10 ENCOUNTER — HOSPITAL ENCOUNTER (OUTPATIENT)
Dept: INFUSION THERAPY | Age: 80
Discharge: HOME | End: 2020-02-10
Payer: MEDICARE

## 2020-02-10 VITALS
SYSTOLIC BLOOD PRESSURE: 144 MMHG | DIASTOLIC BLOOD PRESSURE: 72 MMHG | OXYGEN SATURATION: 97 % | HEIGHT: 66 IN | HEART RATE: 60 BPM | WEIGHT: 128.9 LBS | BODY MASS INDEX: 20.72 KG/M2

## 2020-02-10 DIAGNOSIS — Z17.0 MALIGNANT NEOPLASM OF CENTRAL PORTION OF LEFT BREAST IN FEMALE, ESTROGEN RECEPTOR POSITIVE (HCC): ICD-10-CM

## 2020-02-10 DIAGNOSIS — C50.112 MALIGNANT NEOPLASM OF CENTRAL PORTION OF LEFT BREAST IN FEMALE, ESTROGEN RECEPTOR POSITIVE (HCC): ICD-10-CM

## 2020-02-10 PROCEDURE — 99211 OFF/OP EST MAY X REQ PHY/QHP: CPT

## 2020-02-10 PROCEDURE — 99213 OFFICE O/P EST LOW 20 MIN: CPT | Performed by: INTERNAL MEDICINE

## 2020-02-10 PROCEDURE — 4040F PNEUMOC VAC/ADMIN/RCVD: CPT | Performed by: INTERNAL MEDICINE

## 2020-02-10 PROCEDURE — G8420 CALC BMI NORM PARAMETERS: HCPCS | Performed by: INTERNAL MEDICINE

## 2020-02-10 PROCEDURE — 1123F ACP DISCUSS/DSCN MKR DOCD: CPT | Performed by: INTERNAL MEDICINE

## 2020-02-10 PROCEDURE — 85025 COMPLETE CBC W/AUTO DIFF WBC: CPT

## 2020-02-10 PROCEDURE — 1090F PRES/ABSN URINE INCON ASSESS: CPT | Performed by: INTERNAL MEDICINE

## 2020-02-10 PROCEDURE — 1036F TOBACCO NON-USER: CPT | Performed by: INTERNAL MEDICINE

## 2020-02-10 PROCEDURE — G8427 DOCREV CUR MEDS BY ELIG CLIN: HCPCS | Performed by: INTERNAL MEDICINE

## 2020-02-10 PROCEDURE — G8399 PT W/DXA RESULTS DOCUMENT: HCPCS | Performed by: INTERNAL MEDICINE

## 2020-02-10 PROCEDURE — G8484 FLU IMMUNIZE NO ADMIN: HCPCS | Performed by: INTERNAL MEDICINE

## 2020-06-19 RX ORDER — TAMOXIFEN CITRATE 10 MG/1
10 TABLET ORAL DAILY
Qty: 30 TABLET | Refills: 5 | Status: SHIPPED | OUTPATIENT
Start: 2020-06-19 | End: 2021-03-11

## 2020-07-24 NOTE — PROGRESS NOTES
HISTORY OF PRESENT ILLNESS:  Ms. Paul Mai  is a 67 y.o.   female   who is status post left lumpectomy and sentinel node on 2018 for 2 separate small cancers. Both are strongly ER positive, HER-2 negative, and have a low Ki-67. Both are low-grade invasive ductal carcinomas. They're close enough to each other to be contained within a single lumpectomy specimen        PATHOLOGY REVEALS:  FINAL DIAGNOSIS:  A. 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 Two foci of tumor are present. Largest focus of tumor is 6mm. Two previous biopsy sites identified. Tumor is 5mm from the closest (lateral) margin of excision. Margins of excision are negative. B. Lymph node, left axillary sentinel, excision:  No tumor seen in three lymph nodes (0/3). C.  Left breast, \"attached new margin\", excision:  Benign soft tissue. Negative for malignancy. She and her  had been hospitalized 6 months ago with pneumonia. Her  was diagniosed with idiopathic pulmonary fibrosis and  last May from pneumonia. .      2020 MAMMOGRAM  Narrative   FINDINGS:    A moderate amount of dense glandular tissue observed, breast density   category C. Changes from biopsy in the upper outer quadrant of the left breast are   appreciated with Biozorb device in place. There are scattered benign calcifications. There are no suspicious   calcifications. There are no developing densities or breast masses. There are no   suspicious areas of architectural distortion. Yearly mammographic follow-up is recommended or earlier if clinically   indicated.       Impression   1. History of left breast cancer and lumpectomy. 2. No malignant features. 3. ACR BI-RADS Category 2, benign findings. PHYSICAL EXAM:  The left lumpectomy incision is looking good. There are fibrocystic changes and appropriate post operative changes.   There are no dominant masses, no skin or nipple changes, and no axillary adenopathy. There is nothing suspicious for new carcinoma and no evidence of local tumor recurrence. Saundra Simpler Her BioZorb is still palpable 2 years after placement though it is much smaller. IMPRESSION:    Doing well s/p left lumpectomy 2/6/2018    PLAN: I will see her back in 6 months for physical exam with bilateral mammograms. She will call if anything changes. I have seen, examined and reviewed this patient medication list, appropriate labs and imaging studies. I reviewed relevant medical records and others physicians notes. I discussed the plans of care with the patient. I answered all the questions to the patients satisfaction. I, Dr Yong Benavidez, personally performed the services described in this documentation as scribed by Wing Michael MA in my presence and is both accurate and complete. (Please note that portions of this note were completed with a voice recognition program. Efforts were made to edit the dictations but occasionally words are mis-transcribed.)  Over 50% of the total visit time of 20 minutes in face to face encounter with the patient, out of which more than 50% of the time was spent in counseling patient or family and coordination of care. Counseling included but was not limited to time spent reviewing labs, imaging studies/ treatment plan and answering questions.

## 2020-07-27 ENCOUNTER — OFFICE VISIT (OUTPATIENT)
Dept: SURGERY | Age: 80
End: 2020-07-27
Payer: MEDICARE

## 2020-07-27 VITALS — HEART RATE: 80 BPM | SYSTOLIC BLOOD PRESSURE: 138 MMHG | DIASTOLIC BLOOD PRESSURE: 84 MMHG

## 2020-07-27 PROCEDURE — G8399 PT W/DXA RESULTS DOCUMENT: HCPCS | Performed by: SURGERY

## 2020-07-27 PROCEDURE — 1090F PRES/ABSN URINE INCON ASSESS: CPT | Performed by: SURGERY

## 2020-07-27 PROCEDURE — 99213 OFFICE O/P EST LOW 20 MIN: CPT | Performed by: SURGERY

## 2020-07-27 PROCEDURE — 4040F PNEUMOC VAC/ADMIN/RCVD: CPT | Performed by: SURGERY

## 2020-07-27 PROCEDURE — 1036F TOBACCO NON-USER: CPT | Performed by: SURGERY

## 2020-07-27 PROCEDURE — 1123F ACP DISCUSS/DSCN MKR DOCD: CPT | Performed by: SURGERY

## 2020-07-27 PROCEDURE — G8427 DOCREV CUR MEDS BY ELIG CLIN: HCPCS | Performed by: SURGERY

## 2020-07-27 PROCEDURE — G8420 CALC BMI NORM PARAMETERS: HCPCS | Performed by: SURGERY

## 2020-08-06 ENCOUNTER — OFFICE VISIT (OUTPATIENT)
Dept: CARDIOLOGY | Facility: CLINIC | Age: 80
End: 2020-08-06

## 2020-08-06 VITALS
BODY MASS INDEX: 21.38 KG/M2 | OXYGEN SATURATION: 99 % | DIASTOLIC BLOOD PRESSURE: 78 MMHG | WEIGHT: 133 LBS | HEART RATE: 56 BPM | HEIGHT: 66 IN | SYSTOLIC BLOOD PRESSURE: 138 MMHG

## 2020-08-06 DIAGNOSIS — I10 ESSENTIAL HYPERTENSION: ICD-10-CM

## 2020-08-06 DIAGNOSIS — I50.32 CHRONIC DIASTOLIC (CONGESTIVE) HEART FAILURE (HCC): Primary | ICD-10-CM

## 2020-08-06 PROCEDURE — 99213 OFFICE O/P EST LOW 20 MIN: CPT | Performed by: NURSE PRACTITIONER

## 2020-08-06 PROCEDURE — 93000 ELECTROCARDIOGRAM COMPLETE: CPT | Performed by: NURSE PRACTITIONER

## 2020-08-06 NOTE — PROGRESS NOTES
Subjective:     Encounter Date:08/06/2020      Patient ID: Coreen Wilson is a 79 y.o. female.    Chief Complaint: Follow-up chronic diastolic congestive heart failure and hypertension    Patient presents to follow-up regarding her history of chronic diastolic congestive heart failure.  She was admitted to the hospital in January 2019 and April 2019 for pneumonia, and has followed with Dr. Parekh since then.  She had an echocardiogram at [facility] in May 2019.  Dr. Beck was able to review the images.  His interpretation revealed: A left ventricular ejection fraction of greater than 55%, normal left ventricular diastolic function, normal size and function of the right ventricle, and no valvular pathology.  Dr. Moya had reportedly given her 40 mg of IM Lasix in his office in August 2019 for what he felt was congestive heart failure.  The patient reports Dr. Moya has told her she has had congestive heart failure for years.  She had reported improvement in the leg swelling with the Lasix dose on that occasion.  At her last visit with Dr. Beck 1 year ago, she was euvolemic on exam.  Labs were ordered.  These were all normal including her BNP.  It was not felt that she needed to be prescribed a loop diuretic at that time, but she was instructed to call back if she developed shortness of breath/orthopnea/PND, worsening edema, or significant weight gain (greater than 2 pounds overnight or 5 pounds in 2 days), and as needed Lasix would be prescribed.  She has not called back with those complaints since that time.    Today she states she is feeling well overall.  She reports some time has passed since her  passed away and she has gradually been gaining a little weight as her appetite is improving.  Per [facility], she has gained 9 pounds in 10 months.  She denies any sudden weight gain.  She denies chest pain, shortness of breath, orthopnea, PND, palpitations, syncope or presyncope.  She  reports mild lower extremity edema when she is on her feet all day.  She reports her blood pressures well controlled.  Of note, her Lopressor was replaced with Coreg at her last office visit with Dr. Beck, for better blood pressure control.  She reports compliance with her medications.      The following portions of the patient's history were reviewed and updated as appropriate: allergies, current medications, past family history, past medical history, past social history, past surgical history and problem list.    Review of Systems   Constitution: Positive for malaise/fatigue. Negative for chills, diaphoresis and fever.   HENT: Negative for nosebleeds.    Eyes: Negative for visual disturbance.   Cardiovascular: Positive for leg swelling. Negative for chest pain, claudication, cyanosis, dyspnea on exertion, irregular heartbeat, near-syncope, orthopnea, palpitations, paroxysmal nocturnal dyspnea and syncope.   Respiratory: Negative for cough, hemoptysis, shortness of breath, sputum production and wheezing.    Hematologic/Lymphatic: Negative for bleeding problem.   Skin: Negative for color change and flushing.   Musculoskeletal: Negative for falls.   Gastrointestinal: Negative for bloating, abdominal pain, hematemesis, hematochezia, melena, nausea and vomiting.   Genitourinary: Negative for hematuria.   Neurological: Negative for dizziness, light-headedness and weakness.   Psychiatric/Behavioral: Negative for altered mental status.       No Known Allergies    Current Outpatient Medications:   •  aspirin 81 MG chewable tablet, Chew 81 mg Daily., Disp: , Rfl:   •  atorvastatin (LIPITOR) 80 MG tablet, Take 80 mg by mouth Daily., Disp: , Rfl:   •  BIOTIN PO, Take 10,000 mcg by mouth., Disp: , Rfl:   •  calcium carbonate (OS-BALJEET) 600 MG tablet, Take 600 mg by mouth Daily., Disp: , Rfl:   •  carvedilol (COREG) 6.25 MG tablet, Take 6.25 mg by mouth 2 (Two) Times a Day With Meals., Disp: , Rfl:   •  Cholecalciferol (VITAMIN  "D3) 5000 units capsule capsule, Take 5,000 Units by mouth Daily., Disp: , Rfl:   •  diphenhydrAMINE (BENADRYL) 25 mg capsule, Take 25 mg by mouth Every 6 (Six) Hours As Needed for Itching., Disp: , Rfl:   •  docusate sodium (COLACE) 100 MG capsule, Take 100 mg by mouth 2 (Two) Times a Day., Disp: , Rfl:   •  gabapentin (NEURONTIN) 100 MG capsule, Take  by mouth As Needed., Disp: , Rfl:   •  memantine (NAMENDA) 10 MG tablet, Take 10 mg by mouth 2 (Two) Times a Day., Disp: , Rfl:   •  montelukast (SINGULAIR) 10 MG tablet, Take 10 mg by mouth Every Night., Disp: , Rfl:   •  pantoprazole (PROTONIX) 40 MG EC tablet, Take 40 mg by mouth 2 (Two) Times a Day., Disp: , Rfl:   •  potassium chloride (K-DUR,KLOR-CON) 20 MEQ CR tablet, Take 20 mEq by mouth Daily., Disp: , Rfl:   •  spironolactone-hydrochlorothiazide (ALDACTAZIDE) 25-25 MG tablet, Take 1 tablet by mouth Daily., Disp: , Rfl:   •  tamoxifen (NOLVADEX) 20 MG chemo tablet, Take 20 mg by mouth Daily., Disp: , Rfl:          Objective:    /78   Pulse 56   Ht 167.6 cm (66\")   Wt 60.3 kg (133 lb)   SpO2 99%   BMI 21.47 kg/m²        Physical Exam   Constitutional: She is oriented to person, place, and time. She appears well-developed and well-nourished. No distress.   HENT:   Head: Normocephalic and atraumatic.   Eyes: Pupils are equal, round, and reactive to light.   Neck: Normal range of motion. Neck supple. No JVD present.   Cardiovascular: Normal rate, regular rhythm and normal heart sounds.   No murmur heard.  Pulmonary/Chest: Effort normal and breath sounds normal. No respiratory distress. She exhibits no tenderness.   Abdominal: Soft. Bowel sounds are normal. She exhibits no distension. There is no tenderness.   Musculoskeletal: Normal range of motion. She exhibits no edema.   Neurological: She is alert and oriented to person, place, and time. No cranial nerve deficit.   Skin: Skin is warm and dry. She is not diaphoretic.   Psychiatric: She has a normal " mood and affect. Her behavior is normal.       Lab Review:   Lab Results   Component Value Date    TSH 1.070 10/02/2019     Lab Results   Component Value Date    GLUCOSE 95 10/02/2019    BUN 12 10/02/2019    CREATININE 0.73 10/02/2019    EGFRIFNONA 77 10/02/2019    BCR 16.4 10/02/2019    K 4.1 10/02/2019    CO2 27.7 10/02/2019    CALCIUM 9.2 10/02/2019    ALBUMIN 4.10 10/02/2019    AST 18 10/02/2019    ALT 10 10/02/2019               ECG 12 Lead  Date/Time: 8/6/2020 2:17 PM  Performed by: Franny Zamora APRN  Authorized by: Franny Zamora APRN   Comparison: compared with previous ECG from 10/2/2019  Similar to previous ECG  Rhythm: sinus bradycardia  BPM: 56                Assessment:      Problem List Items Addressed This Visit        Cardiovascular and Mediastinum    Chronic diastolic (congestive) heart failure (CMS/HCC) - Primary    Hypertension          Plan:     1.  Chronic diastolic congestive heart failure: Established problem, stable.  Currently compensated/euvolemic.  It is possible that she may have had exacerbations of  heart failure or volume overload in the past, however based on exam today and findings per Dr. Beck at her visit last year, there has been no evidence of heart failure.  Again, she had a normal LVEF and no evidence of diastolic dysfunction on her echo.  We discussed the importance of a low-sodium heart healthy diet, good blood pressure control, and daily weights.  We also discussed the fact that some of her intermittent peripheral edema may be secondary to venous insufficiency and dependent edema.  At this point, there is no indication for diuresis.  As previously offered by Dr. Beck, we will prescribe her as needed Lasix in the future if she were to develop signs of acute volume overload-worsening edema, shortness of breath/orthopnea/PND, weight gain of greater than 2 pounds overnight or 5 pounds in 2 days.    2.  Essential hypertension: Established problem, stable.  Controlled with Coreg  and Aldactazide.    Follow-up 6 months, sooner with symptoms or concerns.

## 2021-01-06 ENCOUNTER — TELEPHONE (OUTPATIENT)
Dept: SURGERY | Age: 81
End: 2021-01-06

## 2021-01-06 NOTE — TELEPHONE ENCOUNTER
Called pt about her Mammogram appointment and follow with Dr. Merritt Elliott I scheduled. Pt wrote down times Mammogram arrival 1:25 and appointment at 1:30. I also gave her information about her follow up same day with Dr. Merritt Elliott at 2:00. I stated to pt I would also send out a letter with information about all appointments I scheduled. Pt understood verbally.

## 2021-02-26 NOTE — PROGRESS NOTES
HISTORY OF PRESENT ILLNESS:  Ms. Keisha Martins  is a 79 y.o.   female who presents today for a 6 month breast exam following her annual mammogram.     She is status post left lumpectomy and sentinel node on 2018 for 2 separate small cancers. Both are strongly ER positive, HER-2 negative, and have a low Ki-67. Both are low-grade invasive ductal carcinomas. They're close enough to each other to be contained within a single lumpectomy specimen        PATHOLOGY REVEALS:  FINAL DIAGNOSIS:  A. 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 Two foci of tumor are present. Largest focus of tumor is 6mm. Two previous biopsy sites identified. Tumor is 5mm from the closest (lateral) margin of excision. Margins of excision are negative. B. Lymph node, left axillary sentinel, excision:  No tumor seen in three lymph nodes (0/3). C.  Left breast, \"attached new margin\", excision:  Benign soft tissue. Negative for malignancy. She and her  had been hospitalized with pneumonia. Her  was diagniosed with idiopathic pulmonary fibrosis and   May 2019 [facility] pneumonia. [facility]  FINDINGS:  Postsurgical changes from lumpectomy appreciated in the left breast   with volume loss. Scar marker. Tissue expansion device noted in the   upper outer quadrant in the surgical region. There are no developing densities or areas of additional architectural   distortion. There are no suspicious calcifications or dominant masses. Yearly mammographic follow-up recommended or earlier if clinically   indicated.       Impression   1. Postsurgical changes left breast.   2. No malignant features. 3. ACR BI-RADS Category 2. Signed by Dr Ben Corona on 3/1/2021 3:27 PM         PHYSICAL EXAM:  The left lumpectomy incision is looking good. There are fibrocystic changes and appropriate post operative changes.   There are no dominant

## 2021-03-01 ENCOUNTER — HOSPITAL ENCOUNTER (OUTPATIENT)
Dept: WOMENS IMAGING | Age: 81
Discharge: HOME | End: 2021-03-01
Payer: MEDICARE

## 2021-03-01 ENCOUNTER — OFFICE VISIT (OUTPATIENT)
Dept: SURGERY | Age: 81
End: 2021-03-01
Payer: MEDICARE

## 2021-03-01 VITALS — HEART RATE: 72 BPM | DIASTOLIC BLOOD PRESSURE: 70 MMHG | SYSTOLIC BLOOD PRESSURE: 124 MMHG

## 2021-03-01 DIAGNOSIS — Z98.890 S/P LUMPECTOMY, LEFT BREAST: ICD-10-CM

## 2021-03-01 DIAGNOSIS — Z85.3 PERSONAL HISTORY OF BREAST CANCER: ICD-10-CM

## 2021-03-01 DIAGNOSIS — Z17.0 MALIGNANT NEOPLASM OF CENTRAL PORTION OF LEFT BREAST IN FEMALE, ESTROGEN RECEPTOR POSITIVE (HCC): Primary | ICD-10-CM

## 2021-03-01 DIAGNOSIS — C50.112 MALIGNANT NEOPLASM OF CENTRAL PORTION OF LEFT BREAST IN FEMALE, ESTROGEN RECEPTOR POSITIVE (HCC): Primary | ICD-10-CM

## 2021-03-01 PROCEDURE — G8484 FLU IMMUNIZE NO ADMIN: HCPCS | Performed by: SURGERY

## 2021-03-01 PROCEDURE — G8427 DOCREV CUR MEDS BY ELIG CLIN: HCPCS | Performed by: SURGERY

## 2021-03-01 PROCEDURE — G8421 BMI NOT CALCULATED: HCPCS | Performed by: SURGERY

## 2021-03-01 PROCEDURE — 1090F PRES/ABSN URINE INCON ASSESS: CPT | Performed by: SURGERY

## 2021-03-01 PROCEDURE — 99213 OFFICE O/P EST LOW 20 MIN: CPT | Performed by: SURGERY

## 2021-03-01 PROCEDURE — 4040F PNEUMOC VAC/ADMIN/RCVD: CPT | Performed by: SURGERY

## 2021-03-01 PROCEDURE — G8399 PT W/DXA RESULTS DOCUMENT: HCPCS | Performed by: SURGERY

## 2021-03-01 PROCEDURE — 1123F ACP DISCUSS/DSCN MKR DOCD: CPT | Performed by: SURGERY

## 2021-03-01 PROCEDURE — 1036F TOBACCO NON-USER: CPT | Performed by: SURGERY

## 2021-03-01 PROCEDURE — 77066 DX MAMMO INCL CAD BI: CPT

## 2021-03-10 DIAGNOSIS — C50.112 MALIGNANT NEOPLASM OF CENTRAL PORTION OF LEFT BREAST IN FEMALE, ESTROGEN RECEPTOR POSITIVE (HCC): ICD-10-CM

## 2021-03-10 DIAGNOSIS — Z17.0 MALIGNANT NEOPLASM OF CENTRAL PORTION OF LEFT BREAST IN FEMALE, ESTROGEN RECEPTOR POSITIVE (HCC): ICD-10-CM

## 2021-03-11 RX ORDER — TAMOXIFEN CITRATE 10 MG/1
TABLET ORAL
Qty: 30 TABLET | Refills: 5 | Status: SHIPPED | OUTPATIENT
Start: 2021-03-11 | End: 2021-04-01 | Stop reason: SDUPTHER

## 2021-03-22 DIAGNOSIS — C50.112 MALIGNANT NEOPLASM OF CENTRAL PORTION OF LEFT BREAST IN FEMALE, ESTROGEN RECEPTOR POSITIVE (HCC): Primary | ICD-10-CM

## 2021-03-22 DIAGNOSIS — Z17.0 MALIGNANT NEOPLASM OF CENTRAL PORTION OF LEFT BREAST IN FEMALE, ESTROGEN RECEPTOR POSITIVE (HCC): Primary | ICD-10-CM

## 2021-03-31 NOTE — PROGRESS NOTES
Gunjan Britt   1940 4/1/2021     Chief Complaint   Patient presents with    Follow-up     Malignant neoplasm of central portion of left breast in female, estrogen receptor positive ([facility] [address]      INTERVAL HISTORY/HISTORY OF PRESENT ILLNESS:  Diagnosis   Multifocal invasive ductal carcinoma. December 2017   %, GA 99%, HER-2/mary IHC 1+/FISH not amplified   Grade 1   Stage [id number] , stage IA. AJCC 2018   Normal bone density, March 2018  Treatment summary  2 6018-Left lumpectomy/sentinel lymph node biopsy   4/24/2018 through 5/15/2018- 4256 cGy adjuvant RT   June 2018- adjuvant Femara ×5 years   September 2018-switched to tamoxifen due to poor tolerance to AI (severe joint pain). Interval history:  She is status post adjuvant radiation. She has started Femara, but could not tolerate and was switched to tamoxifen September 2018. She has been tolerating tamoxifen well, but was concerned about prior history of stroke. I offered her tamoxifen lower dose 10 mg p.o. daily. She is tolerated well. She denies any new complaints. She denies any vaginal bleeding. She denies any hot flash. She denies any major joint intolerance. She had a mammogram performed earlier last month. Category 2. Cancer history  Ms Ivette Bullard was first seen by me on 3/7/2018 referred by Dr. Severo Ferguson. 12/8/2017-a screening 3-D amada synthesis showed a 5 mm spiculated mass in the left breast. Diagnostic ultrasound was unremarkable. 1/3/2018-core needle biopsy revealed 2 foci consistent with invasive ductal carcinoma, grade 1 with associated low-grade DCIS, cribriform type. At 12 o'clock position. 100%, GA 99%, HER-2/mary IHC 1+/FISH not amplified, Ki-67 7%.   1/18/2018-MRI of the breast showed 2 biopsy sites in the left breast at 12 o'clock position. No pathologic axillary or internal mammary lymphadenopathy.  No evidence of contralateral malignancy in the right breast.   2/6/2018-she underwent a left lumpectomy/sentinel lymph node biopsy revealing a multifocal invasive ductal low-grade adenocarcinoma with 2 foci of tumor. Largest foci measuring 6 mm. Tumor, 5 mm from the closest lateral margin of excision. Margins negative. 3 sentinel lymph nodes negative for metastatic disease. 3/7/2018-she was first seen by me. Recommended adjuvant endocrine therapy with femara. 3/13/2018-normal bone density. 2018 through 5/15/2018- 4256 cGy adjuvant RT   2018- adjuvant Femara ×5 years. 2020 [facility] Bilateral- History of left breast cancer and lumpectomy. No malignant features. 3/1/2021- Bilateral Diagnostic Mammogram Postsurgical changes left breast. No malignant features. ACR BI-RADS Category 2. PAST MEDICAL HISTORY:   Past Medical History:   Diagnosis Date    Breast cancer ([facility] [address] left    2018 invasive ductal carcinoma    Cancer ([facility] [address]     breast    GERD (gastroesophageal reflux disease)     Hyperlipidemia     Hypertension     Personal history of transient cerebral ischemia     Pneumonia     patient diagnosed and in hospital  through 2019    S/P lumpectomy, left breast 2018    Stroke ([facility] [address]           PAST SURGICAL HISTORY:  Past Surgical History:   Procedure Laterality Date    BREAST BIOPSY Left 2018    invasive ductal carcinoma     SECTION      COLONOSCOPY      CYST REMOVAL Left     Left breast     [place] MASTECTOMY, PARTIAL Left 2018    LUMPECTOMY WITH SNB WITH PREOP US AND INTRAOP US GUIDED NEEDLE LOCALIZATION X2 performed by David Fabian MD at [facility]          SOCIAL HISTORY:  Social History     Socioeconomic History    Marital status:       Spouse name: None    Number of children: None    Years of education: None    Highest education level: None   Occupational History    None   Social Needs    Financial resource strain: None    Food insecurity     Worry: None     Inability: None    Transportation needs     Medical: None     Non-medical: None   Tobacco Use    Smoking status: Never Smoker    Smokeless tobacco: Never Used   Substance and Sexual Activity    Alcohol use: No    Drug use: No    Sexual activity: None   Lifestyle    Physical activity     Days per week: None     Minutes per session: None    Stress: None   Relationships    Social connections     Talks on phone: None     Gets together: None     Attends [employer] service: None     Active member of club or organization: None     Attends meetings of clubs or organizations: None     Relationship status: None    Intimate partner violence     Fear of current or ex partner: None     Emotionally abused: None     Physically abused: None     Forced sexual activity: None   Other Topics Concern    None   Social History Narrative    None       FAMILY HISTORY:  Family History   Problem Relation Age of Onset    Lung Cancer Father 64    High Blood Pressure Mother         Current Outpatient Medications   Medication Sig Dispense Refill    tamoxifen (NOLVADEX) 10 MG tablet TAKE ONE TABLET BY MOUTH EVERY DAY 30 tablet 11    Cholecalciferol (VITAMIN D3) 73837 units CAPS Take by mouth      memantine (NAMENDA) 10 MG tablet Take 10 mg by mouth 2 times daily      spironolactone (ALDACTONE) 25 MG tablet Take 25 mg by mouth daily      gabapentin (NEURONTIN) 100 MG capsule Take 100 mg by mouth nightly.       montelukast (SINGULAIR) 10 MG tablet Take 1 tablet by mouth daily 90 tablet 3    carvedilol (COREG) 3.125 MG tablet 3.125 mg 2 times daily       letrozole (FEMARA) 2.5 MG tablet       DiphenhydrAMINE HCl (BENADRYL ALLERGY PO) Take by mouth      Docusate Sodium (COLACE PO) Take by mouth      metoprolol succinate (TOPROL XL) 25 MG extended release tablet Take 25 mg by mouth daily      ASPIR-LOW 81 MG EC tablet Take 81 mg by mouth daily       ALDACTAZIDE 25-25 MG per tablet Take 1 tablet by mouth daily       atorvastatin (LIPITOR) 80 MG tablet Take 80 mg by mouth daily       pantoprazole (PROTONIX) 40 MG tablet Take 40 mg by mouth daily       calcium carbonate-vitamin D (CALTRATE) 600-400 MG-UNIT TABS per tab Take 1 tablet by mouth daily       KLOR-CON 10 10 MEQ extended release tablet Take 10 mEq by mouth daily        No current facility-administered medications for this visit. REVIEW OF SYSTEMS:    Constitutional: no fever, no night sweats, fatigue;   HEENT: no blurring of vision, no double vision, no hearing difficulty, no tinnitus,no ulceration, no dysphagia  Lungs: no cough, no shortness of breath, no wheeze;   CVS: no palpitation, no chest pain, no shortness of breath;  GI: no abdominal pain, no nausea , no vomiting, no constipation;   ENEDELIA: no dysuria, frequency and urgency, no hematuria, no kidney stones;   Musculoskeletal: no joint pain, swelling , stiffness;   Endocrine: no polyuria, polydypsia, no cold or heat intolerence; Hematology/lymphatic: no easy brusing or bleeding, no hx of clotting disorder; no peripheral adenopathy. Dermatology: no skin rash, no eczema, no pruritis; dry skin patch on left temple  Psychiatry: no depression, no anxiety,no panic attacks, no suicide ideation; Neurology: no syncope, no seizures, no numbness or tingling of hands, no numbness or tingling of feet, no paresis;     PHYSICAL EXAM:    Vitals signs:  /62   Pulse 51   Temp 96.9 °F (36.1 °C)   Ht 5' 6\" (1.676 m)   Wt 144 lb 1.6 oz (65.4 kg)   SpO2 97%   BMI 23.26 kg/m²    Pain scale:  Pain Score:   0 - No pain     CONSTITUTIONAL: Alert, appropriate, no acute distress,   EYES: Non icteric, EOM intact, pupils equal round and reactive to light and accommodation. ENT: Oral mucus membranes moist, no oral pharyngeal lesions. External inspection of ears and nose are normal.   NECK: Supple, no masses. No palpable thyroid mass    CHEST/LUNGS: CTA bilaterally, normal respiratory effort   CARDIOVASCULAR: RRR, no murmurs.  No lower extremity edema   ABDOMEN: soft non-tender, active bowel sounds, no hepatosplenomegaly. No palpable masses. EXTREMITIES: warm, Full ROM of all fours extremities. No focal weakness. SKIN: warm, dry with no rashes or lesions  LYMPH: No cervical, clavicular, axillary, or inguinal lymphadenopathy  NEUROLOGIC: follows commands, non focal.   PSYCH: mood and affect appropriate. Alert and oriented to time and place and person. Relevant Lab findings/reviewed by me:  Lab Results   Component Value Date    WBC 8.32 04/01/2021    HGB 13.0 04/01/2021    HCT 40.1 04/01/2021    MCV 88.7 04/01/2021     (L) 04/01/2021     Lab Results   Component Value Date    NEUTROABS 4.81 04/01/2021       Relevant Imaging studies/reviewed by me:  3/1/2021- Bilateral Diagnostic Mammogram Postsurgical changes left breast. No malignant features. ACR BI-RADS Category 2    ASSESSMENT    No orders of the defined types were placed in this encounter. Holly Salcedo was seen today for follow-up. Diagnoses and all orders for this visit:    Malignant neoplasm of central portion of left breast in female, estrogen receptor positive ([facility] [address]  -     tamoxifen (NOLVADEX) 10 MG tablet; TAKE ONE TABLET BY MOUTH EVERY DAY    Encounter for monitoring tamoxifen therapy    Healthcare maintenance         Diagnosis   Multifocal invasive ductal carcinoma. December 2017   %, [place] 99%, HER-2/mary IHC 1+/FISH not amplified   Grade 1   Stage [id number] , stage IA. AJCC 2018   Normal bone density, March 2018  Treatment summary  2 6018-Left lumpectomy/sentinel lymph node biopsy   4/24/2018 through 5/15/2018- 4256 cGy adjuvant RT   June 2018- adjuvant Femara ×5 years   September 2018-switched to tamoxifen due to poor tolerance to AI (severe joint pain)   Tamoxifen 5 mg daily x 5 years through June 2023  She is status post adjuvant radiation. She was started Femara but could not tolerate and was switched to tamoxifen September 2018.  She has been tolerating tamoxifen well, but was concerned about prior history of stroke, and therefore, she was offered tamoxifen reduced dose. She is currently taking 10 mg p.o. daily without any problem. Tamoxifen toxicity-no major side effects. Bone health-normal bone density March 2018. PLAN:  RTC 1 year with NATHAN Borja  Continue tamoxifen 10 mg daily   Continue aspirin      Follow Up:     Return in about 1 year (around 4/1/2022) for Appointment with NATHAN Borja. Data Unavailable     I have seen, examined and reviewed this patient medication list, appropriate labs and imaging studies. I reviewed relevant medical records and others physicians notes. I discussed the plans of care with the patient. I answered all the questions to the patients satisfaction. I have also reviewed the chief complaint (CC) and part of the history (History of Present Illness (HPI), Past Family Social History [facility]), or Review of Systems (ROS) and made changes when appropriated. (Please note that portions of this note were completed with a voice recognition program. Efforts were made to edit the dictations but occasionally words are mis-transcribed.)    I, Natasha Rocha, am scribing for Mis Ndiaye MD. Electronically signed by Natasha Rocha RN on 4/1/2021 at 4:15 PM CDT. I, Dr Rose Platt, personally performed the services described in this documentation as scribed by Natasha Rocha RN in my presence and is both accurate and complete.

## 2021-04-01 ENCOUNTER — HOSPITAL ENCOUNTER (OUTPATIENT)
Dept: INFUSION THERAPY | Age: 81
Discharge: HOME | End: 2021-04-01
Payer: MEDICARE

## 2021-04-01 ENCOUNTER — OFFICE VISIT (OUTPATIENT)
Dept: HEMATOLOGY | Age: 81
End: 2021-04-01
Payer: MEDICARE

## 2021-04-01 VITALS
DIASTOLIC BLOOD PRESSURE: 62 MMHG | TEMPERATURE: 96.9 F | BODY MASS INDEX: 23.16 KG/M2 | HEART RATE: 51 BPM | HEIGHT: 66 IN | OXYGEN SATURATION: 97 % | SYSTOLIC BLOOD PRESSURE: 118 MMHG | WEIGHT: 144.1 LBS

## 2021-04-01 DIAGNOSIS — C50.112 MALIGNANT NEOPLASM OF CENTRAL PORTION OF LEFT BREAST IN FEMALE, ESTROGEN RECEPTOR POSITIVE (HCC): ICD-10-CM

## 2021-04-01 DIAGNOSIS — Z17.0 MALIGNANT NEOPLASM OF CENTRAL PORTION OF LEFT BREAST IN FEMALE, ESTROGEN RECEPTOR POSITIVE (HCC): Primary | ICD-10-CM

## 2021-04-01 DIAGNOSIS — Z00.00 HEALTHCARE MAINTENANCE: ICD-10-CM

## 2021-04-01 DIAGNOSIS — C50.112 MALIGNANT NEOPLASM OF CENTRAL PORTION OF LEFT BREAST IN FEMALE, ESTROGEN RECEPTOR POSITIVE (HCC): Primary | ICD-10-CM

## 2021-04-01 DIAGNOSIS — Z17.0 MALIGNANT NEOPLASM OF CENTRAL PORTION OF LEFT BREAST IN FEMALE, ESTROGEN RECEPTOR POSITIVE (HCC): ICD-10-CM

## 2021-04-01 DIAGNOSIS — Z79.810 ENCOUNTER FOR MONITORING TAMOXIFEN THERAPY: ICD-10-CM

## 2021-04-01 DIAGNOSIS — Z51.81 ENCOUNTER FOR MONITORING TAMOXIFEN THERAPY: ICD-10-CM

## 2021-04-01 LAB
BASOPHILS ABSOLUTE: 0.04 K/UL (ref 0.01–0.08)
BASOPHILS RELATIVE PERCENT: 0.5 % (ref 0.1–1.2)
EOSINOPHILS ABSOLUTE: 0.3 K/UL (ref 0.04–0.54)
EOSINOPHILS RELATIVE PERCENT: 3.6 % (ref 0.7–7)
HCT VFR BLD CALC: 40.1 % (ref 34.1–44.9)
HEMOGLOBIN: 13 G/DL (ref 11.2–15.7)
LYMPHOCYTES ABSOLUTE: 2.68 K/UL (ref 1.18–3.74)
LYMPHOCYTES RELATIVE PERCENT: 32.2 % (ref 19.3–53.1)
MCH RBC QN AUTO: 28.8 PG (ref 25.6–32.2)
MCHC RBC AUTO-ENTMCNC: 32.4 G/DL (ref 32.3–35.5)
MCV RBC AUTO: 88.7 FL (ref 79.4–94.8)
MONOCYTES ABSOLUTE: 0.49 K/UL (ref 0.24–0.82)
MONOCYTES RELATIVE PERCENT: 5.9 % (ref 4.7–12.5)
NEUTROPHILS ABSOLUTE: 4.81 K/UL (ref 1.56–6.13)
NEUTROPHILS RELATIVE PERCENT: 57.8 % (ref 34–71.1)
PDW BLD-RTO: 13 % (ref 11.7–14.4)
PLATELET # BLD: 159 K/UL (ref 182–369)
PMV BLD AUTO: 11.2 FL (ref 7.4–10.4)
RBC # BLD: 4.52 M/UL (ref 3.93–5.22)
WBC # BLD: 8.32 K/UL (ref 3.98–10.04)

## 2021-04-01 PROCEDURE — G8428 CUR MEDS NOT DOCUMENT: HCPCS | Performed by: INTERNAL MEDICINE

## 2021-04-01 PROCEDURE — 99213 OFFICE O/P EST LOW 20 MIN: CPT | Performed by: INTERNAL MEDICINE

## 2021-04-01 PROCEDURE — 1123F ACP DISCUSS/DSCN MKR DOCD: CPT | Performed by: INTERNAL MEDICINE

## 2021-04-01 PROCEDURE — 1036F TOBACCO NON-USER: CPT | Performed by: INTERNAL MEDICINE

## 2021-04-01 PROCEDURE — G8420 CALC BMI NORM PARAMETERS: HCPCS | Performed by: INTERNAL MEDICINE

## 2021-04-01 PROCEDURE — 1090F PRES/ABSN URINE INCON ASSESS: CPT | Performed by: INTERNAL MEDICINE

## 2021-04-01 PROCEDURE — 36415 COLL VENOUS BLD VENIPUNCTURE: CPT

## 2021-04-01 PROCEDURE — 99211 OFF/OP EST MAY X REQ PHY/QHP: CPT

## 2021-04-01 PROCEDURE — G8399 PT W/DXA RESULTS DOCUMENT: HCPCS | Performed by: INTERNAL MEDICINE

## 2021-04-01 PROCEDURE — 4040F PNEUMOC VAC/ADMIN/RCVD: CPT | Performed by: INTERNAL MEDICINE

## 2021-04-01 PROCEDURE — 85025 COMPLETE CBC W/AUTO DIFF WBC: CPT

## 2021-04-01 RX ORDER — TAMOXIFEN CITRATE 10 MG/1
TABLET ORAL
Qty: 30 TABLET | Refills: 11 | Status: SHIPPED | OUTPATIENT
Start: 2021-04-01 | End: 2022-03-21 | Stop reason: SDUPTHER

## 2021-09-20 ENCOUNTER — TELEPHONE (OUTPATIENT)
Dept: SURGERY | Age: 81
End: 2021-09-20

## 2021-09-20 NOTE — TELEPHONE ENCOUNTER
Patient cancel her 3 month follow up with Dr Maddy Campbell that was 9-20-21 ,patient requested to come in for a yearly follow up and needs get her Mammogram scheduled for March 2022,please call patient  with date and time

## 2021-09-21 DIAGNOSIS — Z85.3 PERSONAL HISTORY OF BREAST CANCER: Primary | ICD-10-CM

## 2021-09-21 NOTE — TELEPHONE ENCOUNTER
Left message  I talked with Charlies Hashimoto and she said that was fine.  We will call her closer to March to schedule her for a mammogram and follow up

## 2022-03-18 NOTE — PROGRESS NOTES
HISTORY OF PRESENT ILLNESS:  Ms. Vandana Alexander  is a [de-identified] y.o.   female who presents today for a 1-year breast exam followed by her annual Mammogram. This is followed by left lumpectomy 2018. She is status post left lumpectomy and sentinel node on 2018 for 2 separate small cancers. Both are strongly ER positive, HER-2 negative, and have a low Ki-67. Both are low-grade invasive ductal carcinomas. They're close enough to each other to be contained within a single lumpectomy specimen        PATHOLOGY REVEALS:  FINAL DIAGNOSIS:  A. 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 Two foci of tumor are present. Largest focus of tumor is 6mm. Two previous biopsy sites identified. Tumor is 5mm from the closest (lateral) margin of excision. Margins of excision are negative. B. Lymph node, left axillary sentinel, excision:  No tumor seen in three lymph nodes (0/3). C.  Left breast, \"attached new margin\", excision:  Benign soft tissue. Negative for malignancy. She and her  had been hospitalized with pneumonia. Her  was diagniosed with idiopathic pulmonary fibrosis and   May 2019 from pneumonia. .    She and her family are planning a trip to the [place] [id number] in the next few months    MAMMOGRAM-3/21/2022  FINDINGS:  There are scattered areas of fibroglandular density. No suspicious  findings are present. Stable treatment site in the left breast.  IMPRESSION:  Impression:   1. No mammographic evidence of malignancy. 2.  BI-RADS Final Assessment Category 1: Negative. 3.  Recommend annual mammography. Signed by Dr Valentine First:  The left lumpectomy incision is looking good. There are fibrocystic changes and appropriate post operative changes. There are no dominant masses, no skin or nipple changes, and no axillary adenopathy.  There is nothing suspicious for new carcinoma and no evidence of local tumor recurrence. Dutch Skinner Her BioZorb is still palpable 2 years after placement though it is much smaller. IMPRESSION:    Doing well s/p left lumpectomy 2/6/2018    PLAN:Refilled Tamoxifen. I will see her back in 6-months for a physical exam. She will call in the meantime with any new concerns. I have seen, examined and reviewed this patient medication list, appropriate labs and imaging studies. I reviewed relevant medical records and others physicians notes. I discussed the plans of care with the patient. I answered all the questions to the patients satisfaction. I, Dr Yesy Ambrosio, personally performed the services described in this documentation as scribed by Mihaela Wilson MA in my presence and is both accurate and complete. (Please note that portions of this note were completed with a voice recognition program. Efforts were made to edit the dictations but occasionally words are mis-transcribed.)  Over 50% of the total visit time of 20 minutes in face to face encounter with the patient, out of which more than 50% of the time was spent in counseling patient or family and coordination of care. Counseling included but was not limited to time spent reviewing labs, imaging studies/ treatment plan and answering questions.

## 2022-03-21 ENCOUNTER — OFFICE VISIT (OUTPATIENT)
Dept: SURGERY | Age: 82
End: 2022-03-21
Payer: MEDICARE

## 2022-03-21 ENCOUNTER — HOSPITAL ENCOUNTER (OUTPATIENT)
Dept: WOMENS IMAGING | Age: 82
Discharge: HOME | End: 2022-03-21
Payer: MEDICARE

## 2022-03-21 VITALS
TEMPERATURE: 98.6 F | WEIGHT: 138.2 LBS | DIASTOLIC BLOOD PRESSURE: 70 MMHG | BODY MASS INDEX: 22.21 KG/M2 | HEIGHT: 66 IN | SYSTOLIC BLOOD PRESSURE: 138 MMHG

## 2022-03-21 DIAGNOSIS — Z85.3 PERSONAL HISTORY OF BREAST CANCER: ICD-10-CM

## 2022-03-21 DIAGNOSIS — C50.112 MALIGNANT NEOPLASM OF CENTRAL PORTION OF LEFT BREAST IN FEMALE, ESTROGEN RECEPTOR POSITIVE (HCC): ICD-10-CM

## 2022-03-21 DIAGNOSIS — Z98.890 S/P LUMPECTOMY, LEFT BREAST: Primary | ICD-10-CM

## 2022-03-21 DIAGNOSIS — Z17.0 MALIGNANT NEOPLASM OF CENTRAL PORTION OF LEFT BREAST IN FEMALE, ESTROGEN RECEPTOR POSITIVE (HCC): ICD-10-CM

## 2022-03-21 PROCEDURE — 99213 OFFICE O/P EST LOW 20 MIN: CPT | Performed by: SURGERY

## 2022-03-21 PROCEDURE — 77066 DX MAMMO INCL CAD BI: CPT

## 2022-03-21 RX ORDER — TAMOXIFEN CITRATE 10 MG/1
TABLET ORAL
Qty: 30 TABLET | Refills: 5 | Status: SHIPPED | OUTPATIENT
Start: 2022-03-21 | End: 2022-05-03 | Stop reason: SDUPTHER

## 2022-04-28 ENCOUNTER — TELEPHONE (OUTPATIENT)
Dept: SURGERY | Age: 82
End: 2022-04-28

## 2022-04-28 NOTE — TELEPHONE ENCOUNTER
Geno Tripp called about a billing statement. Per pt [employer] and  should have covered this bill. She has not been sucsessful with reaching  billing dept, and would like claim to be resubmitted. I have updated coverage in account,  for life had been removed. She has gone back to medicare [employer] but had [employer] medic[employer] at the time of visit.

## 2022-05-02 DIAGNOSIS — Z17.0 MALIGNANT NEOPLASM OF CENTRAL PORTION OF LEFT BREAST IN FEMALE, ESTROGEN RECEPTOR POSITIVE (HCC): Primary | ICD-10-CM

## 2022-05-02 DIAGNOSIS — C50.112 MALIGNANT NEOPLASM OF CENTRAL PORTION OF LEFT BREAST IN FEMALE, ESTROGEN RECEPTOR POSITIVE (HCC): Primary | ICD-10-CM

## 2022-05-03 ENCOUNTER — OFFICE VISIT (OUTPATIENT)
Dept: HEMATOLOGY | Age: 82
End: 2022-05-03
Payer: MEDICARE

## 2022-05-03 ENCOUNTER — HOSPITAL ENCOUNTER (OUTPATIENT)
Dept: INFUSION THERAPY | Age: 82
Discharge: HOME | End: 2022-05-03
Payer: MEDICARE

## 2022-05-03 VITALS
WEIGHT: 137 LBS | OXYGEN SATURATION: 97 % | BODY MASS INDEX: 22.11 KG/M2 | HEART RATE: 52 BPM | SYSTOLIC BLOOD PRESSURE: 140 MMHG | DIASTOLIC BLOOD PRESSURE: 78 MMHG

## 2022-05-03 DIAGNOSIS — Z51.81 ENCOUNTER FOR MONITORING TAMOXIFEN THERAPY: ICD-10-CM

## 2022-05-03 DIAGNOSIS — Z78.0 POST-MENOPAUSAL: ICD-10-CM

## 2022-05-03 DIAGNOSIS — C50.112 MALIGNANT NEOPLASM OF CENTRAL PORTION OF LEFT BREAST IN FEMALE, ESTROGEN RECEPTOR POSITIVE (HCC): Primary | ICD-10-CM

## 2022-05-03 DIAGNOSIS — D22.9 ENLARGED SKIN MOLE: ICD-10-CM

## 2022-05-03 DIAGNOSIS — Z17.0 MALIGNANT NEOPLASM OF CENTRAL PORTION OF LEFT BREAST IN FEMALE, ESTROGEN RECEPTOR POSITIVE (HCC): Primary | ICD-10-CM

## 2022-05-03 DIAGNOSIS — Z17.0 MALIGNANT NEOPLASM OF CENTRAL PORTION OF LEFT BREAST IN FEMALE, ESTROGEN RECEPTOR POSITIVE (HCC): ICD-10-CM

## 2022-05-03 DIAGNOSIS — C50.112 MALIGNANT NEOPLASM OF CENTRAL PORTION OF LEFT BREAST IN FEMALE, ESTROGEN RECEPTOR POSITIVE (HCC): ICD-10-CM

## 2022-05-03 DIAGNOSIS — Z79.810 ENCOUNTER FOR MONITORING TAMOXIFEN THERAPY: ICD-10-CM

## 2022-05-03 LAB
BASOPHILS ABSOLUTE: 0.06 K/UL (ref 0.01–0.08)
BASOPHILS RELATIVE PERCENT: 0.9 % (ref 0.1–1.2)
EOSINOPHILS ABSOLUTE: 0.14 K/UL (ref 0.04–0.54)
EOSINOPHILS RELATIVE PERCENT: 2.1 % (ref 0.7–7)
HCT VFR BLD CALC: 38.7 % (ref 34.1–44.9)
HEMOGLOBIN: 12 G/DL (ref 11.2–15.7)
LYMPHOCYTES ABSOLUTE: 2.46 K/UL (ref 1.18–3.74)
LYMPHOCYTES RELATIVE PERCENT: 37.6 % (ref 19.3–53.1)
MCH RBC QN AUTO: 28.4 PG (ref 25.6–32.2)
MCHC RBC AUTO-ENTMCNC: 31 G/DL (ref 32.3–35.5)
MCV RBC AUTO: 91.5 FL (ref 79.4–94.8)
MONOCYTES ABSOLUTE: 0.41 K/UL (ref 0.24–0.82)
MONOCYTES RELATIVE PERCENT: 6.3 % (ref 4.7–12.5)
NEUTROPHILS ABSOLUTE: 3.45 K/UL (ref 1.56–6.13)
NEUTROPHILS RELATIVE PERCENT: 52.6 % (ref 34–71.1)
PDW BLD-RTO: 13.2 % (ref 11.7–14.4)
PLATELET # BLD: 168 K/UL (ref 182–369)
PMV BLD AUTO: 11.5 FL (ref 7.4–10.4)
RBC # BLD: 4.23 M/UL (ref 3.93–5.22)
WBC # BLD: 6.55 K/UL (ref 3.98–10.04)

## 2022-05-03 PROCEDURE — 99213 OFFICE O/P EST LOW 20 MIN: CPT | Performed by: NURSE PRACTITIONER

## 2022-05-03 PROCEDURE — 99212 OFFICE O/P EST SF 10 MIN: CPT

## 2022-05-03 PROCEDURE — 1090F PRES/ABSN URINE INCON ASSESS: CPT | Performed by: NURSE PRACTITIONER

## 2022-05-03 PROCEDURE — G8420 CALC BMI NORM PARAMETERS: HCPCS | Performed by: NURSE PRACTITIONER

## 2022-05-03 PROCEDURE — 85025 COMPLETE CBC W/AUTO DIFF WBC: CPT

## 2022-05-03 PROCEDURE — 1123F ACP DISCUSS/DSCN MKR DOCD: CPT | Performed by: NURSE PRACTITIONER

## 2022-05-03 PROCEDURE — G8427 DOCREV CUR MEDS BY ELIG CLIN: HCPCS | Performed by: NURSE PRACTITIONER

## 2022-05-03 PROCEDURE — 1036F TOBACCO NON-USER: CPT | Performed by: NURSE PRACTITIONER

## 2022-05-03 PROCEDURE — 4040F PNEUMOC VAC/ADMIN/RCVD: CPT | Performed by: NURSE PRACTITIONER

## 2022-05-03 PROCEDURE — G8399 PT W/DXA RESULTS DOCUMENT: HCPCS | Performed by: NURSE PRACTITIONER

## 2022-05-03 RX ORDER — TAMOXIFEN CITRATE 10 MG/1
TABLET ORAL
Qty: 90 TABLET | Refills: 3 | Status: SHIPPED | OUTPATIENT
Start: 2022-05-03

## 2022-05-03 NOTE — PROGRESS NOTES
Progress Note      Pt Name: Jay Valladares  YOB: 1940  MRN: [id number]    Date of evaluation: 5/3/2022  History Obtained From:  patient, electronic medical record    CHIEF COMPLAINT:    Chief Complaint   Patient presents with    Follow-up    Breast Cancer     HISTORY OF PRESENT ILLNESS:    Jay Valladares is a [de-identified] y.o.  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 She is status postlumpectomy, adjuvant radiation therapy and was intolerant to adjuvant Femara 2.5 mg due to severe arthralgias. Recommendation was made for tamoxifen 20 mg daily and she was concerned for experiencing a thrombotic event due to having a history of stroke.  recommended tamoxifen 10 mg p.o. daily for anticipated 10 years, June 2028. Tejal Swift returns today in follow-up for evaluation, side effect monitoring, lab monitoring and further treatment recommendations. She was accompanied by her daughter today. Tejal Swift reported that she has been out of the tamoxifen since January 2022, she was under the impression that our office was no longer providing her with refills. Upon review refills for tamoxifen were sent on 3/11/2021, 4/1/2021 (by Olvin Carballo) and 3/21/2022 (by Dr. Kavitha Santiago), Tejal Swift indicated that she was unaware that the pharmacy had the medications for her. I sent a 90-day prescription for tamoxifen 10 mg daily with 2 refills to [facility] in [address] [place] [address] and her daughter were present when I sent electronic prescriptions. She reported tolerating the tamoxifen without difficulty denying any hot flashes or significant arthralgias. Tejal Swift also verbalized concern of a enlarged nevia on the side of her right face, she reports has grown in size, upon exam it has irregular borders and some different shades of coloring.   She reports previously being seen by  Case, dermatologist.    Furman Schlatter was seen in scheduled follow-up by Dr Susannah Cardona on 03/21/2022, I reviewed the progress note from that office visit which documented bilateral breast exam with no new findings or concerns of recurrence. 03/21/2022 Bilateral mammogram- no mammographic evidence of malignancy    ONCOLOGIC HISTORY:   Diagnosis   · Multifocal invasive ductal carcinoma. December 2017   · %, CO 99%, HER-2/mary IHC 1+/FISH not amplified   · Grade 1   · Stage [id number] , stage IA. AJCC 2018   · Normal bone density, March 2018    Treatment summary  · 2 6018-Left lumpectomy/sentinel lymph node biopsy   · 4/24/2018 through 5/15/2018- 4256 cGy adjuvant RT   · June 2018- adjuvant Femara ×5 years   · September 2018-switched to tamoxifen due to poor tolerance to AI (severe joint pain). Cancer history  Ms Karely Mott was first seen by Dr Xander East on 3/7/2018 referred by Dr. Leelee Brizuela. · 12/8/2017-a screening 3-D amada synthesis showed a 5 mm spiculated mass in the left breast. Diagnostic ultrasound was unremarkable. · 1/3/2018-core needle biopsy revealed 2 foci consistent with invasive ductal carcinoma, grade 1 with associated low-grade DCIS, cribriform type. At 12 o'clock position. 100%, CO 99%, HER-2/mary IHC 1+/FISH not amplified, Ki-67 7%. · 1/18/2018-MRI of the breast showed 2 biopsy sites in the left breast at 12 o'clock position. No pathologic axillary or internal mammary lymphadenopathy. No evidence of contralateral malignancy in the right breast.   · 2/6/2018-she underwent a left lumpectomy/sentinel lymph node biopsy revealing a multifocal invasive ductal low-grade adenocarcinoma with 2 foci of tumor. Largest foci measuring 6 mm. Tumor, 5 mm from the closest lateral margin of excision. Margins negative. 3 sentinel lymph nodes negative for metastatic disease. · 3/7/2018-she was first seen by Dr Xander East. Recommended adjuvant endocrine therapy with femara.    · 4/24/2018 through 5/15/2018- 4256 cGy adjuvant RT   · 2018- adjuvant Femara ×5 years. · 2020 [facility] Bilateral- History of left breast cancer and lumpectomy. No malignant features. · 3/1/2021- Bilateral Diagnostic Mammogram Postsurgical changes left breast. No malignant features. ACR BI-RADS Category 2.   · 2022 Bilateral mammogram- no mammographic evidence of malignancy        Age-appropriate health screening:  3/13/2018-normal bone density. Past Medical History:    Past Medical History:   Diagnosis Date    Breast cancer ([address] left    2018 invasive ductal carcinoma    Cancer [address]     breast    GERD (gastroesophageal reflux disease)     History of therapeutic radiation     Hyperlipidemia     Hypertension     Personal history of transient cerebral ischemia     Pneumonia     patient diagnosed and in hospital  through 2019    S/P lumpectomy, left breast 2018    Stroke ([facility] [address]        Past Surgical History:    Past Surgical History:   Procedure Laterality Date    BREAST BIOPSY Left 2018    invasive ductal carcinoma    BREAST LUMPECTOMY Left 2018    infiltrating ductal carcinoma     SECTION      COLONOSCOPY      CYST REMOVAL Left     Left breast     MI MASTECTOMY, PARTIAL Left 2018    LUMPECTOMY WITH SNB WITH PREOP US AND INTRAOP US GUIDED NEEDLE LOCALIZATION X2 performed by Yousif Jacobsen MD at [facility]         Current Medications:    Current Outpatient Medications   Medication Sig Dispense Refill    tamoxifen (NOLVADEX) 10 MG tablet TAKE ONE TABLET BY MOUTH EVERY DAY 90 tablet 3    Cholecalciferol (VITAMIN D3) 09422 units CAPS Take by mouth      memantine (NAMENDA) 10 MG tablet Take 10 mg by mouth 2 times daily      spironolactone (ALDACTONE) 25 MG tablet Take 25 mg by mouth daily      gabapentin (NEURONTIN) 100 MG capsule Take 100 mg by mouth nightly.       montelukast (SINGULAIR) 10 MG tablet Take 1 tablet by mouth daily 90 tablet 3    carvedilol (COREG) 3.125 MG tablet 3.125 mg 2 times daily       letrozole (FEMARA) 2.5 MG tablet       DiphenhydrAMINE HCl (BENADRYL ALLERGY PO) Take by mouth      Docusate Sodium (COLACE PO) Take by mouth      metoprolol succinate (TOPROL XL) 25 MG extended release tablet Take 25 mg by mouth daily      ASPIR-LOW 81 MG EC tablet Take 81 mg by mouth daily       ALDACTAZIDE 25-25 MG per tablet Take 1 tablet by mouth daily       atorvastatin (LIPITOR) 80 MG tablet Take 80 mg by mouth daily       pantoprazole (PROTONIX) 40 MG tablet Take 40 mg by mouth daily       calcium carbonate-vitamin D (CALTRATE) 600-400 MG-UNIT TABS per tab Take 1 tablet by mouth daily       KLOR-CON 10 10 MEQ extended release tablet Take 10 mEq by mouth daily        No current facility-administered medications for this visit. Allergies: No Known Allergies    Social History:    Social History     Tobacco Use    Smoking status: Never Smoker    Smokeless tobacco: Never Used   Substance Use Topics    Alcohol use: No    Drug use: No       Family History:   Family History   Problem Relation Age of Onset    Lung Cancer Father 64    High Blood Pressure Mother        Vitals:  Vitals:    05/03/22 1455   BP: (!) 140/78   Pulse: 52   SpO2: 97%   Weight: 137 lb (62.1 kg)        Subjective   REVIEW OF SYSTEMS:   Review of Systems   Constitutional: Positive for fatigue. Negative for chills, diaphoresis and fever. HENT: Negative. Negative for congestion, ear pain, hearing loss, nosebleeds, sore throat and tinnitus. Eyes: Negative. Negative for pain, discharge and redness. Respiratory: Negative. Negative for cough, shortness of breath and wheezing. Cardiovascular: Negative. Negative for chest pain, palpitations and leg swelling. Gastrointestinal: Negative. Negative for abdominal pain, blood in stool, constipation, diarrhea, nausea and vomiting. Endocrine: Negative for polydipsia. Genitourinary: Negative for dysuria, flank pain, frequency, hematuria and urgency. Musculoskeletal: Negative. Negative for back pain, myalgias and neck pain. Skin: Negative. Negative for rash. Neurological: Negative. Negative for dizziness, tremors, seizures, weakness and headaches. Hematological: Does not bruise/bleed easily. Psychiatric/Behavioral: Negative. The patient is not nervous/anxious. Objective   PHYSICAL EXAM:  Physical Exam  Vitals reviewed. Constitutional:       General: She is not in acute distress. Appearance: She is well-developed. HENT:      Head: Normocephalic and atraumatic. Mouth/Throat:      Pharynx: Uvula midline. Tonsils: No tonsillar exudate. Eyes:      General: Lids are normal.      Conjunctiva/sclera: Conjunctivae normal.      Pupils: Pupils are equal, round, and reactive to light. Neck:      Thyroid: No thyroid mass or thyromegaly. Vascular: No JVD. Trachea: Trachea normal. No tracheal deviation. Cardiovascular:      Rate and Rhythm: Normal rate and regular rhythm. Pulses: Normal pulses. Heart sounds: Normal heart sounds. Pulmonary:      Effort: Pulmonary effort is normal. No respiratory distress. Breath sounds: Normal breath sounds. No wheezing or rales. Chest:      Chest wall: No tenderness. Abdominal:      General: Bowel sounds are normal. There is no distension. Palpations: Abdomen is soft. There is no mass. Tenderness: There is no abdominal tenderness. There is no guarding. Musculoskeletal:         General: No tenderness or deformity. Cervical back: Normal range of motion and neck supple. Comments: Range of motion within normal limits x4 extremities   Skin:     General: Skin is warm. Findings: No bruising, erythema or rash. Neurological:      Mental Status: She is alert and oriented to person, place, and time. Cranial Nerves: No cranial nerve deficit.       Coordination: Coordination normal.   Psychiatric:         Behavior: Behavior normal.         Thought Content: Thought content normal.         Labs reviewed today:  Lab Results   Component Value Date    WBC 6.55 05/03/2022    HGB 12.0 05/03/2022    HCT 38.7 05/03/2022    MCV 91.5 05/03/2022     (L) 05/03/2022     Lab Results   Component Value Date    NEUTROABS 3.45 05/03/2022       ASSESSMENT/PLAN:      1. Multifocal invasive ductal carcinoma of the left breast, ER/CO positive and HER2/mary negative, grade 1 stage Ia, 1/3/2018. Currently taking tamoxifen 10 mg p.o. daily for anticipated 10 years, June 2028. Reports last dose of tamoxifen in January 2022, reports being out of medication. Dr. Karolina Gusman sent a refill in March, unfortunately this is not repeated from the pharmacy either. No new breast complaints    Rosette Kocher was seen in scheduled follow-up by Dr Karolina Gusman on 03/21/2022, I reviewed the progress note from that office visit which documented bilateral breast exam with no new findings or concerns of recurrence. - tamoxifen (NOLVADEX) 10 MG tablet; TAKE ONE TABLET BY MOUTH EVERY DAY  Dispense: 90 tablet; Refill: 3    2. Encounter for monitoring tamoxifen  -Denies hot flashes or significant arthralgias    3. At risk for bone loss, post-menopausal. 3/13/2018-normal bone density.  - DEXA BONE DENSITY 2 SITES; Future    4. Enlarged skin mole, right cheek area with irregular shaped nevi and color changes. Has been evaluated by Dr. Kirk in the past  - External Referral To Dermatology, Dr. Kirk for further evaluation recommendations     5. Survivorship and health maintenance recommendations  Keep a healthy body weight. Dietary recommendations include limit energy intake from total fats and sugars; increase consumption of fruit and vegetables, as well as legumes, whole grains and nuts. Engage in regular physical activity (150 minutes spread through the week).   Other recommendations include minimizing alcohol intake, avoid use of tobacco products. Practice sun safety: Utilize a sunscreen with an SPF of at least 27. Avoiding tanning beds. Ensure adequate amount of sleep. Follow-up with primary care regularly and for further recommendations regarding age-appropriate screening for cancer, wellbeing visit (preventive measures), follow-up and treatment for other medical comorbidities. I discussed all of the above findings included in the assessment and plan with the patient and the patient is in agreement to move forward with current recommendations/treatment. I have addressed all of their questions and concerns that were verbalized.         FOLLOW UP:  1. Follow up given for 12 months or sooner, if needed  2. Continue to follow with other medical providers as recommended  3. Labs at next visit: CBC, CMP and vitamin D level if not recently evaluated    EMR Dragon/Transcription disclaimer:   Much of this encounter note is an electronic transcription/translation of spoken language to printed text. The electronic translation of spoken language may permit erroneous, or at times, nonsensical words or phrases to be inadvertently transcribed; although attempts have made to review the note for such errors, some may still exist.  Please excuse any unrecognized transcription errors and contact us if the air is unintelligible or needs documented correction. Also, portions of this note have been copied forward, however, changed to reflect the most current clinical status of this patient. Electronically signed by NATHAN Nicole on 5/4/2022 at 6:11 PM  Junior CARREON am pre-charting as a registered nurse for NATHAN Ledezma.

## 2022-05-04 ASSESSMENT — ENCOUNTER SYMPTOMS
EYE REDNESS: 0
GASTROINTESTINAL NEGATIVE: 1
RESPIRATORY NEGATIVE: 1
WHEEZING: 0
NAUSEA: 0
DIARRHEA: 0
EYE DISCHARGE: 0
SORE THROAT: 0
SHORTNESS OF BREATH: 0
CONSTIPATION: 0
BACK PAIN: 0
ABDOMINAL PAIN: 0
BLOOD IN STOOL: 0
EYES NEGATIVE: 1
COUGH: 0
EYE PAIN: 0
VOMITING: 0

## 2022-05-09 NOTE — TELEPHONE ENCOUNTER
[facility] billing dept advises    \"I am not showing a balance on the patients account.   I show that everything is out pending with insurance currently\"

## 2022-05-13 ENCOUNTER — HOSPITAL ENCOUNTER (OUTPATIENT)
Dept: ULTRASOUND IMAGING | Age: 82
Discharge: HOME | End: 2022-05-13
Payer: MEDICARE

## 2022-05-13 DIAGNOSIS — Z78.0 POST-MENOPAUSAL: ICD-10-CM

## 2022-05-13 PROCEDURE — 77080 DXA BONE DENSITY AXIAL: CPT

## 2022-06-24 ENCOUNTER — TELEPHONE (OUTPATIENT)
Dept: INFUSION THERAPY | Age: 82
End: 2022-06-24

## 2022-09-22 NOTE — PROGRESS NOTES
HISTORY OF PRESENT ILLNESS:  Ms. Rae Carmen  is a [de-identified] y.o.   female who presents today for a six month breast exam. This is followed by a left lumpectomy 2018. She is status post left lumpectomy and sentinel node on 2018 for 2 separate small cancers. Both are strongly ER positive, HER-2 negative, and have a low Ki-67. Both are low-grade invasive ductal carcinomas. They're close enough to each other to be contained within a single lumpectomy specimen        PATHOLOGY REVEALS:  FINAL DIAGNOSIS:  A. 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 Two foci of tumor are present. Largest focus of tumor is 6mm. Two previous biopsy sites identified. Tumor is 5mm from the closest (lateral) margin of excision. Margins of excision are negative. B. Lymph node, left axillary sentinel, excision:  No tumor seen in three lymph nodes (0/3). C.  Left breast, \"attached new margin\", excision:  Benign soft tissue. Negative for malignancy. She and her  had been hospitalized with pneumonia. Her  was diagniosed with idiopathic pulmonary fibrosis and   May 2019 from pneumonia. .    She and her family are planning a trip to the [place] [id number] in the next few months    Her son, a general in the [employer], and had a motorcycle wreck and is slowly recovering. MAMMOGRAM-3/21/2022  FINDINGS:  There are scattered areas of fibroglandular density. No suspicious  findings are present. Stable treatment site in the left breast.  IMPRESSION:  Impression:   1. No mammographic evidence of malignancy. 2.  BI-RADS Final Assessment Category 1: Negative. 3.  Recommend annual mammography. Signed by Dr Eugenio Quiroga:  The left lumpectomy incision is looking good. There are fibrocystic changes and appropriate post operative changes. There are no dominant masses, no skin or nipple changes, and no axillary adenopathy.  There is nothing suspicious for new carcinoma and no evidence of local tumor recurrence. Yvette Lozada Her BioZorb is much less palpable 4 years after placement     She is currently taking Tamoxifen. IMPRESSION:    Doing well s/p left lumpectomy 2/6/2018    PLAN: I will see her back in 6 months for exam followed by mammogram. We can go yearly after that visit. I have seen, examined and reviewed this patient medication list, appropriate labs and imaging studies. I reviewed relevant medical records and others physicians notes. I discussed the plans of care with the patient. I answered all the questions to the patients satisfaction. I, Dr Jimbo Hyatt, personally performed the services described in this documentation as scribed by Bonnie Wu MA in my presence and is both accurate and complete. (Please note that portions of this note were completed with a voice recognition program. Efforts were made to edit the dictations but occasionally words are mis-transcribed.)  Over 50% of the total visit time of 20 minutes in face to face encounter with the patient, out of which more than 50% of the time was spent in counseling patient or family and coordination of care. Counseling included but was not limited to time spent reviewing labs, imaging studies/ treatment plan and answering questions.

## 2022-09-28 ENCOUNTER — OFFICE VISIT (OUTPATIENT)
Dept: SURGERY | Age: 82
End: 2022-09-28
Payer: MEDICARE

## 2022-09-28 VITALS — DIASTOLIC BLOOD PRESSURE: 78 MMHG | HEART RATE: 72 BPM | SYSTOLIC BLOOD PRESSURE: 110 MMHG

## 2022-09-28 DIAGNOSIS — Z17.0 MALIGNANT NEOPLASM OF CENTRAL PORTION OF LEFT BREAST IN FEMALE, ESTROGEN RECEPTOR POSITIVE (HCC): ICD-10-CM

## 2022-09-28 DIAGNOSIS — Z98.890 S/P LUMPECTOMY, LEFT BREAST: ICD-10-CM

## 2022-09-28 DIAGNOSIS — C50.112 MALIGNANT NEOPLASM OF CENTRAL PORTION OF LEFT BREAST IN FEMALE, ESTROGEN RECEPTOR POSITIVE (HCC): ICD-10-CM

## 2022-09-28 DIAGNOSIS — Z85.3 PERSONAL HISTORY OF BREAST CANCER: Primary | ICD-10-CM

## 2022-09-28 PROCEDURE — 1090F PRES/ABSN URINE INCON ASSESS: CPT | Performed by: SURGERY

## 2022-09-28 PROCEDURE — 99213 OFFICE O/P EST LOW 20 MIN: CPT | Performed by: SURGERY

## 2022-09-28 PROCEDURE — G8420 CALC BMI NORM PARAMETERS: HCPCS | Performed by: SURGERY

## 2022-09-28 PROCEDURE — 1036F TOBACCO NON-USER: CPT | Performed by: SURGERY

## 2022-09-28 PROCEDURE — 1123F ACP DISCUSS/DSCN MKR DOCD: CPT | Performed by: SURGERY

## 2022-09-28 PROCEDURE — G8399 PT W/DXA RESULTS DOCUMENT: HCPCS | Performed by: SURGERY

## 2022-09-28 PROCEDURE — G8428 CUR MEDS NOT DOCUMENT: HCPCS | Performed by: SURGERY

## 2023-03-28 ENCOUNTER — OFFICE VISIT (OUTPATIENT)
Dept: SURGERY | Age: 83
End: 2023-03-28

## 2023-03-28 ENCOUNTER — HOSPITAL ENCOUNTER (OUTPATIENT)
Dept: WOMENS IMAGING | Age: 83
Discharge: HOME | End: 2023-03-28
Payer: MEDICARE

## 2023-03-28 VITALS
HEART RATE: 89 BPM | TEMPERATURE: 98.6 F | OXYGEN SATURATION: 97 % | WEIGHT: 138.3 LBS | BODY MASS INDEX: 22.23 KG/M2 | HEIGHT: 66 IN

## 2023-03-28 DIAGNOSIS — Z85.3 PERSONAL HISTORY OF BREAST CANCER: ICD-10-CM

## 2023-03-28 DIAGNOSIS — Z12.31 VISIT FOR SCREENING MAMMOGRAM: ICD-10-CM

## 2023-03-28 DIAGNOSIS — Z85.3 PERSONAL HISTORY OF BREAST CANCER: Primary | ICD-10-CM

## 2023-03-28 PROCEDURE — 77066 DX MAMMO INCL CAD BI: CPT | Performed by: RADIOLOGY

## 2023-03-28 PROCEDURE — 77066 DX MAMMO INCL CAD BI: CPT

## 2023-03-28 NOTE — PROGRESS NOTES
HISTORY OF PRESENT ILLNESS:  Ms. Judd Phan  is a 81 y.o.   female who presents today for follow up. This is followed by a left lumpectomy 2/6/2018. She is status post left lumpectomy and sentinel node on 2/6/2018 for 2 separate small cancers. Both are strongly ER positive, HER-2 negative, and have a low Ki-67. Both were low-grade invasive ductal carcinomas. PATHOLOGY REVEALS:  FINAL DIAGNOSIS:  A. 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 Two foci of tumor are present. Largest focus of tumor is 6mm. Two previous biopsy sites identified. Tumor is 5mm from the closest (lateral) margin of excision. Margins of excision are negative. B. Lymph node, left axillary sentinel, excision:  No tumor seen in three lymph nodes (0/3). C.  Left breast, \"attached new margin\", excision:  Benign soft tissue. Negative for malignancy. 3/28/2023 11:09 AM   [facility] DIGITAL DIAGNOSTIC W OR WO CAD BILATERAL   SCREENING MAMMOGRAPHY:  Today's examination consists of 2-D/3-D combo   standard craniocaudal and oblique views of both breasts. Comparison is   made   with all prior mammograms since December 8, 2017   . Breast   parenchyma is heterogeneously dense. Postlumpectomy scarring is   present without worrisome change throughout the superior and central   left breast. Scattered benign calcifications redemonstrated throughout   the right breast. There is no worrisome change or finding identified   in either breast.  There is no suspicious mammographic finding seen in   either breast. The mammograms were evaluated using computer aided   detection. Impression   Impression:   Bilateral Breasts, BI-RADS 2, benign. Recommendation is annual   screening mammography. PHYSICAL EXAM:  The left lumpectomy incision is well healed. There are fibrocystic changes and appropriate post operative changes.   There are no dominant masses, no skin or nipple

## 2023-05-03 DIAGNOSIS — C50.112 MALIGNANT NEOPLASM OF CENTRAL PORTION OF LEFT BREAST IN FEMALE, ESTROGEN RECEPTOR POSITIVE (HCC): Primary | ICD-10-CM

## 2023-05-03 DIAGNOSIS — Z17.0 MALIGNANT NEOPLASM OF CENTRAL PORTION OF LEFT BREAST IN FEMALE, ESTROGEN RECEPTOR POSITIVE (HCC): Primary | ICD-10-CM

## 2023-06-26 ENCOUNTER — HOSPITAL ENCOUNTER (OUTPATIENT)
Dept: INFUSION THERAPY | Age: 83
Discharge: HOME | End: 2023-06-26
Payer: MEDICARE

## 2023-06-26 ENCOUNTER — OFFICE VISIT (OUTPATIENT)
Dept: HEMATOLOGY | Age: 83
End: 2023-06-26
Payer: MEDICARE

## 2023-06-26 VITALS
OXYGEN SATURATION: 97 % | BODY MASS INDEX: 21.79 KG/M2 | SYSTOLIC BLOOD PRESSURE: 115 MMHG | DIASTOLIC BLOOD PRESSURE: 60 MMHG | WEIGHT: 135 LBS | HEART RATE: 60 BPM

## 2023-06-26 DIAGNOSIS — C50.112 MALIGNANT NEOPLASM OF CENTRAL PORTION OF LEFT BREAST IN FEMALE, ESTROGEN RECEPTOR POSITIVE (HCC): Primary | ICD-10-CM

## 2023-06-26 DIAGNOSIS — C50.112 MALIGNANT NEOPLASM OF CENTRAL PORTION OF LEFT BREAST IN FEMALE, ESTROGEN RECEPTOR POSITIVE (HCC): ICD-10-CM

## 2023-06-26 DIAGNOSIS — Z79.810 ENCOUNTER FOR MONITORING TAMOXIFEN THERAPY: ICD-10-CM

## 2023-06-26 DIAGNOSIS — Z17.0 MALIGNANT NEOPLASM OF CENTRAL PORTION OF LEFT BREAST IN FEMALE, ESTROGEN RECEPTOR POSITIVE (HCC): Primary | ICD-10-CM

## 2023-06-26 DIAGNOSIS — Z51.81 ENCOUNTER FOR MONITORING TAMOXIFEN THERAPY: ICD-10-CM

## 2023-06-26 DIAGNOSIS — Z17.0 MALIGNANT NEOPLASM OF CENTRAL PORTION OF LEFT BREAST IN FEMALE, ESTROGEN RECEPTOR POSITIVE (HCC): ICD-10-CM

## 2023-06-26 LAB
BASOPHILS # BLD: 0.06 K/UL (ref 0.01–0.08)
BASOPHILS NFR BLD: 1.1 % (ref 0.1–1.2)
EOSINOPHIL # BLD: 0.12 K/UL (ref 0.04–0.54)
EOSINOPHIL NFR BLD: 2.1 % (ref 0.7–7)
ERYTHROCYTE [DISTWIDTH] IN BLOOD BY AUTOMATED COUNT: 13.3 % (ref 11.7–14.4)
HCT VFR BLD AUTO: 37.5 % (ref 34.1–44.9)
HGB BLD-MCNC: 11.7 G/DL (ref 11.2–15.7)
LYMPHOCYTES # BLD: 2.18 K/UL (ref 1.18–3.74)
LYMPHOCYTES NFR BLD: 38.7 % (ref 19.3–53.1)
MCH RBC QN AUTO: 28.3 PG (ref 25.6–32.2)
MCHC RBC AUTO-ENTMCNC: 31.2 G/DL (ref 32.3–35.5)
MCV RBC AUTO: 90.6 FL (ref 79.4–94.8)
MONOCYTES # BLD: 0.39 K/UL (ref 0.24–0.82)
MONOCYTES NFR BLD: 6.9 % (ref 4.7–12.5)
NEUTROPHILS # BLD: 2.87 K/UL (ref 1.56–6.13)
NEUTS SEG NFR BLD: 51 % (ref 34–71.1)
PLATELET # BLD AUTO: 152 K/UL (ref 182–369)
PMV BLD AUTO: 11.8 FL (ref 7.4–10.4)
RBC # BLD AUTO: 4.14 M/UL (ref 3.93–5.22)
WBC # BLD AUTO: 5.63 K/UL (ref 3.98–10.04)

## 2023-06-26 PROCEDURE — 85025 COMPLETE CBC W/AUTO DIFF WBC: CPT

## 2023-06-26 PROCEDURE — G8427 DOCREV CUR MEDS BY ELIG CLIN: HCPCS | Performed by: NURSE PRACTITIONER

## 2023-06-26 PROCEDURE — 99211 OFF/OP EST MAY X REQ PHY/QHP: CPT

## 2023-06-26 PROCEDURE — 1036F TOBACCO NON-USER: CPT | Performed by: NURSE PRACTITIONER

## 2023-06-26 PROCEDURE — 1123F ACP DISCUSS/DSCN MKR DOCD: CPT | Performed by: NURSE PRACTITIONER

## 2023-06-26 PROCEDURE — 1090F PRES/ABSN URINE INCON ASSESS: CPT | Performed by: NURSE PRACTITIONER

## 2023-06-26 PROCEDURE — G8420 CALC BMI NORM PARAMETERS: HCPCS | Performed by: NURSE PRACTITIONER

## 2023-06-26 PROCEDURE — 99213 OFFICE O/P EST LOW 20 MIN: CPT | Performed by: NURSE PRACTITIONER

## 2023-06-26 PROCEDURE — 36415 COLL VENOUS BLD VENIPUNCTURE: CPT

## 2023-06-26 PROCEDURE — G8399 PT W/DXA RESULTS DOCUMENT: HCPCS | Performed by: NURSE PRACTITIONER

## 2023-06-26 ASSESSMENT — ENCOUNTER SYMPTOMS: SHORTNESS OF BREATH: 1

## 2023-07-02 ASSESSMENT — ENCOUNTER SYMPTOMS
GASTROINTESTINAL NEGATIVE: 1
EYE DISCHARGE: 0
NAUSEA: 0
ABDOMINAL PAIN: 0
BACK PAIN: 0
CONSTIPATION: 0
SORE THROAT: 0
EYE PAIN: 0
BLOOD IN STOOL: 0
WHEEZING: 0
EYES NEGATIVE: 1
COUGH: 0
EYE REDNESS: 0
DIARRHEA: 0
VOMITING: 0

## 2023-10-05 ENCOUNTER — TELEPHONE (OUTPATIENT)
Dept: HEMATOLOGY | Age: 83
End: 2023-10-05

## 2023-10-05 NOTE — TELEPHONE ENCOUNTER
I called pt to remind them of their appt on 10/09/2023 but was unable to leave vm due to mailbox being full or not set up.

## 2023-10-06 NOTE — PROGRESS NOTES
Progress Note      Pt Name: Ya Evans  YOB: 1940  MRN: [id number]    Date of evaluation: 12/27/2023  History Obtained From:  patient, electronic medical record    CHIEF COMPLAINT:    Chief Complaint   Patient presents with    Follow-up     Malignant neoplasm of central portion of left breast in female, estrogen receptor positive      HISTORY OF PRESENT ILLNESS:    Ya Evans is a 83 y.o.  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  Current recommendation is for tamoxifen 10 mg p.o. daily for 10 years, anticipate continuing through June 2028 (she has a history of a CVA so dose reduction is appropriate). Ya returns today in follow-up for evaluation, side effect monitoring, lab monitoring and further treatment recommendations.  She was accompanied by her daughter today.     Today's clinic visit to include physical assessment, review of systems, any radiograph or lab findings that were available and plan of care are documented below.     ONCOLOGIC HISTORY:   Diagnosis   Multifocal invasive ductal carcinoma. December 2017   %, MT 99%, HER-2/mary IHC 1+/FISH not amplified   Grade 1   Stage [id number] , stage IA. AJCC 2018   Normal bone density, March 2018    Treatment summary  2 6018-Left lumpectomy/sentinel lymph node biopsy   4/24/2018 through 5/15/2018- 4256 cGy adjuvant RT   June 2018- adjuvant Femara ×5 years   September 2018-switched to tamoxifen due to poor tolerance to AI (severe joint pain).    Cancer history  Ms Ya Evans was first seen by Dr Martinez on 3/7/2018 referred by Dr. Lewis Bowling.  12/8/2017-a screening 3-D amada synthesis showed a 5 mm spiculated mass in the left breast. Diagnostic ultrasound was unremarkable.   1/3/2018-core needle biopsy revealed 2 foci consistent with invasive ductal carcinoma, grade 1 with associated

## 2023-12-27 ENCOUNTER — OFFICE VISIT (OUTPATIENT)
Dept: HEMATOLOGY | Age: 83
End: 2023-12-27
Payer: MEDICARE

## 2023-12-27 ENCOUNTER — HOSPITAL ENCOUNTER (OUTPATIENT)
Dept: INFUSION THERAPY | Age: 83
Discharge: HOME | End: 2023-12-27
Payer: MEDICARE

## 2023-12-27 VITALS
TEMPERATURE: 97.6 F | HEIGHT: 66 IN | SYSTOLIC BLOOD PRESSURE: 120 MMHG | BODY MASS INDEX: 21.69 KG/M2 | HEART RATE: 53 BPM | WEIGHT: 135 LBS | DIASTOLIC BLOOD PRESSURE: 64 MMHG | OXYGEN SATURATION: 98 %

## 2023-12-27 DIAGNOSIS — D22.9 ENLARGED SKIN MOLE: ICD-10-CM

## 2023-12-27 DIAGNOSIS — Z17.0 MALIGNANT NEOPLASM OF CENTRAL PORTION OF LEFT BREAST IN FEMALE, ESTROGEN RECEPTOR POSITIVE (HCC): ICD-10-CM

## 2023-12-27 DIAGNOSIS — C50.112 MALIGNANT NEOPLASM OF CENTRAL PORTION OF LEFT BREAST IN FEMALE, ESTROGEN RECEPTOR POSITIVE (HCC): ICD-10-CM

## 2023-12-27 DIAGNOSIS — Z51.81 ENCOUNTER FOR MONITORING TAMOXIFEN THERAPY: Primary | ICD-10-CM

## 2023-12-27 DIAGNOSIS — Z79.810 ENCOUNTER FOR MONITORING TAMOXIFEN THERAPY: Primary | ICD-10-CM

## 2023-12-27 PROCEDURE — G8420 CALC BMI NORM PARAMETERS: HCPCS | Performed by: NURSE PRACTITIONER

## 2023-12-27 PROCEDURE — G8399 PT W/DXA RESULTS DOCUMENT: HCPCS | Performed by: NURSE PRACTITIONER

## 2023-12-27 PROCEDURE — 1090F PRES/ABSN URINE INCON ASSESS: CPT | Performed by: NURSE PRACTITIONER

## 2023-12-27 PROCEDURE — 99214 OFFICE O/P EST MOD 30 MIN: CPT | Performed by: NURSE PRACTITIONER

## 2023-12-27 PROCEDURE — 99211 OFF/OP EST MAY X REQ PHY/QHP: CPT

## 2023-12-27 PROCEDURE — G8484 FLU IMMUNIZE NO ADMIN: HCPCS | Performed by: NURSE PRACTITIONER

## 2023-12-27 PROCEDURE — G8427 DOCREV CUR MEDS BY ELIG CLIN: HCPCS | Performed by: NURSE PRACTITIONER

## 2023-12-27 PROCEDURE — 1036F TOBACCO NON-USER: CPT | Performed by: NURSE PRACTITIONER

## 2023-12-27 PROCEDURE — 1123F ACP DISCUSS/DSCN MKR DOCD: CPT | Performed by: NURSE PRACTITIONER

## 2023-12-27 RX ORDER — ALPRAZOLAM 0.5 MG/1
TABLET ORAL
COMMUNITY

## 2023-12-27 RX ORDER — FUROSEMIDE 20 MG/1
1 TABLET ORAL DAILY PRN
COMMUNITY

## 2023-12-27 RX ORDER — TAMOXIFEN CITRATE 10 MG/1
TABLET ORAL
Qty: 90 TABLET | Refills: 3 | Status: SHIPPED | OUTPATIENT
Start: 2023-12-27

## 2024-01-03 ASSESSMENT — ENCOUNTER SYMPTOMS
EYE PAIN: 0
ABDOMINAL PAIN: 0
WHEEZING: 0
EYE REDNESS: 0
VOMITING: 0
SHORTNESS OF BREATH: 0
GASTROINTESTINAL NEGATIVE: 1
COUGH: 0
SORE THROAT: 0
DIARRHEA: 0
BACK PAIN: 0
CONSTIPATION: 0
BLOOD IN STOOL: 0
EYE DISCHARGE: 0
EYES NEGATIVE: 1
RESPIRATORY NEGATIVE: 1
NAUSEA: 0

## 2024-04-01 ENCOUNTER — HOSPITAL ENCOUNTER (OUTPATIENT)
Dept: WOMENS IMAGING | Age: 84
Discharge: HOME | End: 2024-04-01
Payer: MEDICARE

## 2024-04-01 DIAGNOSIS — Z12.31 VISIT FOR SCREENING MAMMOGRAM: ICD-10-CM

## 2024-04-01 PROCEDURE — 77063 BREAST TOMOSYNTHESIS BI: CPT

## 2024-04-10 NOTE — PROGRESS NOTES
Ya Evans comes today for her follow-up breast exam.  She has had no new breast complaints.  She reports no new palpable masses.  There is no skin or nipple changes.  There is no nipple discharge.  She has no appreciable evidence of supraclavicular or axillary adenopathy.      Of note, she is s/p left lumpectomy and sentinel node on 2/6/2018 for 2 separate small cancers. Both are strongly ER positive, HER-2 negative, and have a low Ki-67. Both were low-grade invasive ductal carcinomas.     Patient Active Problem List    Diagnosis Date Noted    Sedentary lifestyle 08/07/2019    Dietary counseling 08/07/2019    Hoarseness 08/07/2019    Breast cancer (HCC)     S/P lumpectomy, left breast 2/6/2018 09/18/2018    Malignant neoplasm of central portion of left breast in female, estrogen receptor positive (HCC) 01/09/2018       Current Outpatient Medications   Medication Sig Dispense Refill    ALPRAZolam (XANAX) 0.5 MG tablet TAKE 0.5 TO 1 TABLET BY MOUTH TWICE DAILY AS NEEDED      furosemide (LASIX) 20 MG tablet Take 1 tablet by mouth daily as needed      tamoxifen (NOLVADEX) 10 MG tablet TAKE ONE TABLET BY MOUTH EVERY DAY 90 tablet 3    Cholecalciferol (VITAMIN D3) 99237 units CAPS Take by mouth      memantine (NAMENDA) 10 MG tablet Take 1 tablet by mouth 2 times daily      spironolactone (ALDACTONE) 25 MG tablet Take 1 tablet by mouth daily      gabapentin (NEURONTIN) 100 MG capsule Take 1 capsule by mouth nightly.      montelukast (SINGULAIR) 10 MG tablet Take 1 tablet by mouth daily 90 tablet 3    carvedilol (COREG) 3.125 MG tablet 1 tablet 2 times daily      letrozole (FEMARA) 2.5 MG tablet       DiphenhydrAMINE HCl (BENADRYL ALLERGY PO) Take by mouth      Docusate Sodium (COLACE PO) Take by mouth      metoprolol succinate (TOPROL XL) 25 MG extended release tablet Take 1 tablet by mouth daily      ASPIR-LOW 81 MG EC tablet Take 1 tablet by mouth daily      ALDACTAZIDE 25-25 MG per tablet Take 1 tablet by mouth

## 2024-04-12 ENCOUNTER — OFFICE VISIT (OUTPATIENT)
Dept: SURGERY | Age: 84
End: 2024-04-12
Payer: MEDICARE

## 2024-04-12 VITALS — HEART RATE: 64 BPM | BODY MASS INDEX: 21.86 KG/M2 | WEIGHT: 136 LBS | HEIGHT: 66 IN

## 2024-04-12 DIAGNOSIS — Z85.3 PERSONAL HISTORY OF BREAST CANCER: Primary | ICD-10-CM

## 2024-04-12 DIAGNOSIS — Z12.31 VISIT FOR SCREENING MAMMOGRAM: ICD-10-CM

## 2024-04-12 PROCEDURE — 1123F ACP DISCUSS/DSCN MKR DOCD: CPT | Performed by: PHYSICIAN ASSISTANT

## 2024-04-12 PROCEDURE — G8420 CALC BMI NORM PARAMETERS: HCPCS | Performed by: PHYSICIAN ASSISTANT

## 2024-04-12 PROCEDURE — 1036F TOBACCO NON-USER: CPT | Performed by: PHYSICIAN ASSISTANT

## 2024-04-12 PROCEDURE — G8399 PT W/DXA RESULTS DOCUMENT: HCPCS | Performed by: PHYSICIAN ASSISTANT

## 2024-04-12 PROCEDURE — 1090F PRES/ABSN URINE INCON ASSESS: CPT | Performed by: PHYSICIAN ASSISTANT

## 2024-04-12 PROCEDURE — 99213 OFFICE O/P EST LOW 20 MIN: CPT | Performed by: PHYSICIAN ASSISTANT

## 2024-04-12 PROCEDURE — G8427 DOCREV CUR MEDS BY ELIG CLIN: HCPCS | Performed by: PHYSICIAN ASSISTANT

## 2024-04-15 ENCOUNTER — HOSPITAL ENCOUNTER (OUTPATIENT)
Dept: GENERAL RADIOLOGY | Facility: HOSPITAL | Age: 84
Discharge: HOME | End: 2024-04-15
Payer: MEDICARE

## 2024-04-15 ENCOUNTER — OFFICE VISIT (OUTPATIENT)
Dept: NEUROSURGERY | Facility: CLINIC | Age: 84
End: 2024-04-15
Payer: MEDICARE

## 2024-04-15 ENCOUNTER — APPOINTMENT (OUTPATIENT)
Dept: OTHER | Facility: HOSPITAL | Age: 84
End: 2024-04-15
Payer: MEDICARE

## 2024-04-15 VITALS — HEIGHT: 66 IN | BODY MASS INDEX: 22.5 KG/M2 | WEIGHT: 140 LBS

## 2024-04-15 DIAGNOSIS — M54.12 CERVICAL RADICULOPATHY: ICD-10-CM

## 2024-04-15 DIAGNOSIS — M50.30 DEGENERATION OF CERVICAL INTERVERTEBRAL DISC: Primary | ICD-10-CM

## 2024-04-15 DIAGNOSIS — Z78.9 NONSMOKER: ICD-10-CM

## 2024-04-15 PROCEDURE — 99204 OFFICE O/P NEW MOD 45 MIN: CPT | Performed by: NURSE PRACTITIONER

## 2024-04-15 PROCEDURE — 1160F RVW MEDS BY RX/DR IN RCRD: CPT | Performed by: NURSE PRACTITIONER

## 2024-04-15 PROCEDURE — 1159F MED LIST DOCD IN RCRD: CPT | Performed by: NURSE PRACTITIONER

## 2024-04-15 PROCEDURE — 72052 X-RAY EXAM NECK SPINE 6/>VWS: CPT

## 2024-04-15 RX ORDER — CYCLOBENZAPRINE HCL 5 MG
5 TABLET ORAL 3 TIMES DAILY PRN
COMMUNITY

## 2024-04-15 NOTE — PROGRESS NOTES
Chief complaint:   Chief Complaint   Patient presents with    Neck Pain     New patient ref by Dr. Way for neck and arm pain patient states she has a lot of headaches       Subjective     HPI: This is a 83-year-old female patient who was referred to us by Dr. Donaldo Way for neck pain and left upper extremity pain.  She is here to be evaluated today.  The patient says that she did start having the neck pain about 3 months ago.  She does relate to having a fall but did not have an onset of neck pain initially with the fall.  Currently the pain in her neck is constant.  It is worse with turning.  It is better with medication.  She does complain of some pain radiating to her left upper extremity that is radicular all the way to her hand along with numbness and tingling in her hand.  Denies any bowel or bladder incontinence.  She has not done any recent physical therapy or chiropractic care pain management injections.  She is right-hand dominant.  She is retired.  Denies any tobacco, alcohol, or illicit drug use.  She has been taking gabapentin along with a muscle relaxer.    Review of Systems   Musculoskeletal:  Positive for neck pain.   Neurological:  Positive for numbness.   Psychiatric/Behavioral: Negative.     All other systems reviewed and are negative.       Past Medical History:   Diagnosis Date    Breast cancer     CHF (congestive heart failure)     Coronary artery disease     Hyperlipidemia     Hypertension     Stroke      Past Surgical History:   Procedure Laterality Date    BREAST LUMPECTOMY      CARPAL TUNNEL RELEASE       SECTION      ENDOSCOPY       Family History   Problem Relation Age of Onset    Arrhythmia Mother     Cancer Father      Social History     Tobacco Use    Smoking status: Never    Smokeless tobacco: Never   Vaping Use    Vaping status: Never Used   Substance Use Topics    Alcohol use: No    Drug use: No     (Not in a hospital admission)    Allergies:  Lorazepam    Objective  "     Vital Signs  Ht 167.6 cm (66\")   Wt 63.5 kg (140 lb)   BMI 22.60 kg/m²     Physical Exam  Constitutional:       Appearance: Normal appearance. She is well-developed.   HENT:      Head: Normocephalic.   Eyes:      General: Lids are normal.      Extraocular Movements: EOM normal.      Conjunctiva/sclera: Conjunctivae normal.      Pupils: Pupils are equal, round, and reactive to light.   Pulmonary:      Effort: Pulmonary effort is normal.      Breath sounds: Normal breath sounds.   Musculoskeletal:         General: Normal range of motion.      Cervical back: Normal range of motion.   Skin:     General: Skin is warm.   Neurological:      Mental Status: She is alert and oriented to person, place, and time.      GCS: GCS eye subscore is 4. GCS verbal subscore is 5. GCS motor subscore is 6.      Cranial Nerves: No cranial nerve deficit.      Sensory: No sensory deficit.      Motor: Motor strength is normal.     Gait: Gait is intact.      Deep Tendon Reflexes: Reflexes are normal and symmetric. Reflexes normal.   Psychiatric:         Speech: Speech normal.         Behavior: Behavior normal.         Thought Content: Thought content normal.         Neurologic Exam     Mental Status   Oriented to person, place, and time.   Attention: normal. Concentration: normal.   Speech: speech is normal   Level of consciousness: alert  Normal comprehension.     Cranial Nerves     CN II   Visual fields full to confrontation.     CN III, IV, VI   Pupils are equal, round, and reactive to light.  Extraocular motions are normal.     CN V   Facial sensation intact.     CN VII   Facial expression full, symmetric.     CN VIII   CN VIII normal.     CN IX, X   CN IX normal.   CN X normal.     CN XI   CN XI normal.     CN XII   CN XII normal.     Motor Exam   Muscle bulk: normal    Strength   Strength 5/5 throughout.     Sensory Exam   Light touch normal.     Gait, Coordination, and Reflexes     Gait  Gait: normal    Reflexes   Reflexes 2+ " except as noted.       Imaging review: MRI of the neck that was done on April 8, 2024 shows mild right foraminal narrowing at C4-5.  At C5-6 disc degeneration with Modic endplate changes is noted with left-sided foraminal narrowing.  At C6-7 mild bilateral foraminal narrowing.  No fracture visualized.  No cord signal change.        Assessment/Plan: The patient is complaining of neck pain and left upper extremity pain.  Majority of her pain is in her neck.  She does have disc degeneration with left-sided foraminal stenosis.  For first line conservative care of cervical pain, I would like to send Ms. Wilson for a dedicated course of physician directed physical therapy consisting of 2-3 times a week for 4-6 weeks.    Return for reassessment with me after 6-8 weeks after physical therapy.     Should Ms. Wilson not have any improvement from physical therapy, I think it would be prudent to send the patient for an MRI of the cervical spine to see if there is anything from a surgical standpoint that needs to be addressed.     I advised the patient to call and return sooner for new or worsening complaints of weakness, paresthesias, gait disturbances, or any additional concerns.  Treatment options discussed in detail with Coreen and the patient voiced understanding.  Ms. Wilson agrees with this plan of care.     Patient is a nonsmoker  The patient's Body mass index is 22.6 kg/m².. BMI is within normal parameters. No follow-up required.  Advance Care Planning   ACP discussion was held with the patient during this visit. Patient does not have an advance directive, information provided.  STEADI Fall Risk Assessment was completed, and patient is at MODERATE risk for falls. Assessment completed on:4/15/2024       Diagnoses and all orders for this visit:    1. Degeneration of cervical intervertebral disc (Primary)  -     XR Spine Cervical Complete With Obli Flex Ext  -     Ambulatory Referral to Physical Therapy Evaluate and treat,  Neuro; Strengthening, ROM, Stretching; Full weight bearing    2. Cervical radiculopathy  -     XR Spine Cervical Complete With Obli Flex Ext  -     Ambulatory Referral to Physical Therapy Evaluate and treat, Neuro; Strengthening, ROM, Stretching; Full weight bearing    3. Nonsmoker    4. Body mass index (BMI) of 22.0-22.9 in adult    Other orders  -     IMAGING SCANNED          I discussed the patients findings and my recommendations with patient    Marcell Shah, APRN  04/15/24  12:55 CDT

## 2024-04-15 NOTE — PATIENT INSTRUCTIONS
Advance Care Planning and Advance Directives     You make decisions on a daily basis - decisions about where you want to live, your career, your home, your life. Perhaps one of the most important decisions you face is your choice for future medical care. Take time to talk with your family and your healthcare team and start planning today.  Advance Care Planning is a process that can help you:  Understand possible future healthcare decisions in light of your own experiences  Reflect on those decision in light of your goals and values  Discuss your decisions with those closest to you and the healthcare professionals that care for you  Make a plan by creating a document that reflects your wishes    Surrogate Decision Maker  In the event of a medical emergency, which has left you unable to communicate or to make your own decisions, you would need someone to make decisions for you.  It is important to discuss your preferences for medical treatment with this person while you are in good health.     Qualities of a surrogate decision maker:  Willing to take on this role and responsibility  Knows what you want for future medical care  Willing to follow your wishes even if they don't agree with them  Able to make difficult medical decisions under stressful circumstances    Advance Directives  These are legal documents you can create that will guide your healthcare team and decision maker(s) when needed. These documents can be stored in the electronic medical record.    Living Will - a legal document to guide your care if you have a terminal condition or a serious illness and are unable to communicate. States vary by statute in document names/types, but most forms may include one or more of the following:        -  Directions regarding life-prolonging treatments        -  Directions regarding artificially provided nutrition/hydration        -  Choosing a healthcare decision maker        -  Direction regarding organ/tissue  donation    Durable Power of  for Healthcare - this document names an -in-fact to make medical decisions for you, but it may also allow this person to make personal and financial decisions for you. Please seek the advice of an  if you need this type of document.    **Advance Directives are not required and no one may discriminate against you if you do not sign one.    Medical Orders  Many states allow specific forms/orders signed by your physician to record your wishes for medical treatment in your current state of health. This form, signed in personal communication with your physician, addresses resuscitation and other medical interventions that you may or may not want.      For more information or to schedule a time with a [facility] Advance Care Planning Facilitator contact: [email or web address] or call [phone] and someone will contact you directly.

## 2024-04-23 NOTE — PROGRESS NOTES
YOB: 1940  Location: [facility]  Location Address: [address] [place] [address], [facility] [address], [address] [place] [address]  Location Phone: [phone]    Chief Complaint   Patient presents with    Thyroid Problem       History of Present Illness  Coreen Wilson is a 83 y.o. female.  Coreen Wilson is here for evaluation of ENT complaints. The patient has had problems with thyroid nodules. This was coincidentally found on a CT scan. She denies fatigue, hoarseness, globus sensation, and dysphagia. She has not had thyroid labs.     Thyroid ultrasound recommends FNA of the left thyroid nodule.     Reviewed:             RADIOLOGY - SCAN - US THYROID_[facility]_24 (2024)   Past Medical History:   Diagnosis Date    Breast cancer     CHF (congestive heart failure)     Coronary artery disease     Hyperlipidemia     Hypertension     Stroke        Past Surgical History:   Procedure Laterality Date    BREAST LUMPECTOMY      CARPAL TUNNEL RELEASE       SECTION      ENDOSCOPY         Outpatient Medications Marked as Taking for the 24 encounter (Office Visit) with Jag Puente APRN   Medication Sig Dispense Refill    aspirin 81 MG chewable tablet Chew 1 tablet Daily.      atorvastatin (LIPITOR) 80 MG tablet Take 1 tablet by mouth Daily.      BIOTIN PO Take 10,000 mcg by mouth.      calcium carbonate (OS-BALJEET) 600 MG tablet Take 1 tablet by mouth Daily.      Cholecalciferol (VITAMIN D3) 5000 units capsule capsule Take 1 capsule by mouth Daily.      famotidine (PEPCID) 20 MG tablet Take 1 tablet by mouth Daily As Needed.      memantine (NAMENDA) 10 MG tablet Take 1 tablet by mouth 2 (Two) Times a Day.      montelukast (SINGULAIR) 10 MG tablet Take 1 tablet by mouth Every Night.      spironolactone-hydrochlorothiazide (ALDACTAZIDE) 25-25 MG tablet Take 1 tablet by mouth Daily.      tamoxifen (NOLVADEX) 20 MG chemo tablet Take 1 tablet by mouth Daily.         Lorazepam    Family History   Problem Relation Age of  Onset    Arrhythmia Mother     Cancer Father        Social History     Socioeconomic History    Marital status:     Number of children: 4   Tobacco Use    Smoking status: Never    Smokeless tobacco: Never   Vaping Use    Vaping status: Never Used   Substance and Sexual Activity    Alcohol use: No    Drug use: No    Sexual activity: Defer       Review of Systems   Constitutional: Negative.    HENT: Negative.     Neurological: Negative.        Vitals:    04/24/24 0905   BP: 120/77   Pulse: 63   Temp: 98 °F (36.7 °C)       Body mass index is 22.48 kg/m².    Objective     Physical Exam  Vitals reviewed.   Constitutional:       Appearance: Normal appearance. She is normal weight.   HENT:      Head: Normocephalic.      Right Ear: Tympanic membrane, ear canal and external ear normal.      Left Ear: Tympanic membrane, ear canal and external ear normal.      Nose: Nose normal.      Mouth/Throat:      Lips: Pink.   Neck:      Comments: Thyroid nodules noted on ultrasound  Neurological:      Mental Status: She is alert.   Psychiatric:         Behavior: Behavior is cooperative.         Assessment & Plan   Diagnoses and all orders for this visit:    1. Multiple thyroid nodules (Primary)  -     TSH; Future  -     T4, Free; Future  -     T3; Future  -     Thyroid Peroxidase Antibody  -     US Thyroid; Future  -     US Guided Thyroid Biopsy; Future    Other orders  -     Fine Needle Aspiration; Standing      * Surgery not found *  Orders Placed This Encounter   Procedures    US Thyroid     Standing Status:   Future     Standing Expiration Date:   4/24/2025     Order Specific Question:   Reason for Exam:     Answer:   thyroid nodules     Order Specific Question:   Release to patient     Answer:   Routine Release [[id number]]    US Guided Thyroid Biopsy     Standing Status:   Future     Standing Expiration Date:   4/24/2025     Order Specific Question:   What side of the thyroid should be biopsied?     Answer:   Left     Order  Specific Question:   Reason for Exam:     Answer:   thyroid nodule     Order Specific Question:   What is the TRADS score?     Answer:   TR5     Order Specific Question:   Release to patient     Answer:   Routine Release [[id number]]    TSH     Standing Status:   Future     Standing Expiration Date:   4/24/2025     Order Specific Question:   Release to patient     Answer:   Routine Release [[id number]]    T4, Free     Standing Status:   Future     Standing Expiration Date:   4/24/2025     Order Specific Question:   Release to patient     Answer:   Routine Release [[id number]]    T3     Standing Status:   Future     Standing Expiration Date:   4/24/2025     Order Specific Question:   Release to patient     Answer:   Routine Release [[id number]]    Thyroid Peroxidase Antibody     Order Specific Question:   Release to patient     Answer:   Routine Release [[id number]]     Will obtain thyroid labs    The patient has a thyroid nodule. I explained the pathology of thyroid nodules including the risks of cancer. The options of surgery versus an observational course were discussed. Recommendation was made for a FINE NEEDLE ASPIRATION of the nodule/mass     Return in about 6 months (around 10/24/2024) for Recheck, with ultrasound.       Patient Instructions   The patient has a thyroid nodule. I explained the pathology of thyroid nodules including the risks of cancer. The options of surgery versus an observational course were discussed. Recommendation was made for a FINE NEEDLE ASPIRATION of the nodule/mass

## 2024-04-24 ENCOUNTER — OFFICE VISIT (OUTPATIENT)
Dept: OTOLARYNGOLOGY | Facility: CLINIC | Age: 84
End: 2024-04-24
Payer: MEDICARE

## 2024-04-24 VITALS
SYSTOLIC BLOOD PRESSURE: 120 MMHG | HEART RATE: 63 BPM | DIASTOLIC BLOOD PRESSURE: 77 MMHG | WEIGHT: 139.25 LBS | TEMPERATURE: 98 F | HEIGHT: 66 IN | BODY MASS INDEX: 22.38 KG/M2

## 2024-04-24 DIAGNOSIS — E04.2 MULTIPLE THYROID NODULES: Primary | ICD-10-CM

## 2024-04-24 RX ORDER — FAMOTIDINE 20 MG/1
1 TABLET, FILM COATED ORAL DAILY PRN
COMMUNITY

## 2024-04-24 RX ORDER — HYDROCODONE BITARTRATE AND ACETAMINOPHEN 5; 325 MG/1; MG/1
1 TABLET ORAL 2 TIMES DAILY PRN
COMMUNITY

## 2024-04-24 RX ORDER — SPIRONOLACTONE 25 MG/1
25 TABLET ORAL DAILY
COMMUNITY

## 2024-04-26 ENCOUNTER — TELEPHONE (OUTPATIENT)
Dept: NEUROSURGERY | Facility: CLINIC | Age: 84
End: 2024-04-26

## 2024-04-26 ENCOUNTER — TELEPHONE (OUTPATIENT)
Dept: OTOLARYNGOLOGY | Facility: CLINIC | Age: 84
End: 2024-04-26

## 2024-04-26 NOTE — TELEPHONE ENCOUNTER
PATIENT CALLED AND STATES THAT [facility] HAS NOT RECEIVED THE REFERRAL FOR PT. PLEASE CHECK FAX # AND RE-SEND, AND CALL TO LET THE PT KNOW WHEN SHE CAN SCHEDULE. THANKS.

## 2024-04-26 NOTE — TELEPHONE ENCOUNTER
[facility] received a fax that requires your attention. The document has been indexed to the patient’s chart for your review.      Reason for sending: RCVD PROG NOTE 1.8.24, EKG AND     Documents Description: PROG NOTE 1.8.24, EKG 1.8.24 AND CT CHEST 1.8.24    Name of Sender: NGUYỄN MACEDO    Date Indexed: 04.26.24    Notes (if needed):

## 2024-05-06 ENCOUNTER — LAB (OUTPATIENT)
Dept: LAB | Facility: HOSPITAL | Age: 84
End: 2024-05-06
Payer: MEDICARE

## 2024-05-06 ENCOUNTER — TELEPHONE (OUTPATIENT)
Dept: OTOLARYNGOLOGY | Facility: CLINIC | Age: 84
End: 2024-05-06
Payer: MEDICARE

## 2024-05-06 ENCOUNTER — HOSPITAL ENCOUNTER (OUTPATIENT)
Dept: ULTRASOUND IMAGING | Facility: HOSPITAL | Age: 84
Discharge: HOME | End: 2024-05-06
Payer: MEDICARE

## 2024-05-06 DIAGNOSIS — E04.2 MULTIPLE THYROID NODULES: ICD-10-CM

## 2024-05-06 LAB
T3 SERPL-MCNC: 133 NG/DL (ref 80–200)
T4 FREE SERPL-MCNC: 1.1 NG/DL (ref 0.93–1.7)
TSH SERPL DL<=0.05 MIU/L-ACNC: 1.48 UIU/ML (ref 0.27–4.2)

## 2024-05-06 PROCEDURE — 84439 ASSAY OF FREE THYROXINE: CPT

## 2024-05-06 PROCEDURE — 76942 ECHO GUIDE FOR BIOPSY: CPT

## 2024-05-06 PROCEDURE — 88172 CYTP DX EVAL FNA 1ST EA SITE: CPT | Performed by: NURSE PRACTITIONER

## 2024-05-06 PROCEDURE — 88112 CYTOPATH CELL ENHANCE TECH: CPT | Performed by: NURSE PRACTITIONER

## 2024-05-06 PROCEDURE — 84480 ASSAY TRIIODOTHYRONINE (T3): CPT

## 2024-05-06 PROCEDURE — 86376 MICROSOMAL ANTIBODY EACH: CPT | Performed by: NURSE PRACTITIONER

## 2024-05-06 PROCEDURE — 76536 US EXAM OF HEAD AND NECK: CPT

## 2024-05-06 PROCEDURE — 84443 ASSAY THYROID STIM HORMONE: CPT

## 2024-05-06 RX ORDER — LIDOCAINE HYDROCHLORIDE 10 MG/ML
5 INJECTION, SOLUTION INFILTRATION; PERINEURAL ONCE
Status: DISPENSED | OUTPATIENT
Start: 2024-05-06

## 2024-05-07 ENCOUNTER — TELEPHONE (OUTPATIENT)
Dept: OTOLARYNGOLOGY | Facility: CLINIC | Age: 84
End: 2024-05-07
Payer: MEDICARE

## 2024-05-07 LAB
BEAKER LAB AP INTRAOPERATIVE CONSULTATION: NORMAL
CYTO UR: NORMAL
LAB AP CASE REPORT: NORMAL
LAB AP DIAGNOSIS COMMENT: NORMAL
Lab: NORMAL
PATH REPORT.FINAL DX SPEC: NORMAL
PATH REPORT.GROSS SPEC: NORMAL
THYROPEROXIDASE AB SERPL-ACNC: <9 IU/ML (ref 0–34)

## 2024-05-09 ENCOUNTER — TELEPHONE (OUTPATIENT)
Dept: OTOLARYNGOLOGY | Facility: CLINIC | Age: 84
End: 2024-05-09
Payer: MEDICARE

## 2024-05-13 NOTE — PROGRESS NOTES
YOB: 1940  Location: [facility]  Location Address: [address] [place] [address], [facility] [address], [address] [place] [address]  Location Phone: [phone]    Chief Complaint   Patient presents with   • Discuss FNA       History of Present Illness  Coreen Wilson is a 83 y.o. female.  Coreen Wilson is here for follow up of ENT complaints. 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 Joe     She is taking aspirin daily through Dr. Way       T3 (2024 10:58)   T4, Free (2024 10:58)   TSH (2024 10:58)     Fine Needle Aspiration: [id number]  Order: [id number]  Status: Final result       Visible to patient: No (inaccessible in MyChart)       Next appt: 2024 at 10:00 AM in Otolaryngology (Luciano Borges MD)    Specimen Information: Thyroid; Body Fluid   1 Result Note       1 Follow-up Encounter      Component    Note to Patients    This report may contain a detailed description of human tissue sent by a health care provider to the laboratory for pathologic evaluation. The content of this report is essential for diagnosis and may provide important critical findings. This information may be unfamiliar to patients to review without a medical professional present. It is advised that the patient review this report in the presence of a health care provider who can answer questions and explain the results.   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   Electronically signed by Jerrod Jessica MD on 2024 at 0843           Past Medical History:   Diagnosis Date   • Breast cancer    • CHF (congestive heart failure)    • Coronary artery disease    • Hyperlipidemia    • Hypertension    • Stroke        Past Surgical History:   Procedure Laterality Date   • BREAST LUMPECTOMY     • CARPAL TUNNEL RELEASE     •  SECTION     • ENDOSCOPY         No outpatient medications have been marked as taking for the 24 encounter (Office Visit) with Luciano Borges MD.     Current Facility-Administered Medications for the 24 encounter (Office Visit) with Luciano Borges MD   Medication Dose Route Frequency Provider Last Rate Last Admin   • lidocaine (XYLOCAINE) 1 % injection 5 mL  5 mL Infiltration Once Leif Roa, DO           Lorazepam    Family History   Problem Relation Age of Onset   • Arrhythmia Mother    • Cancer Father        Social History     Socioeconomic History   • Marital status:    • Number of children: 4   Tobacco Use   • Smoking status: Never   • Smokeless tobacco: Never   Vaping Use   • Vaping status: Never Used   Substance and Sexual Activity   • Alcohol use: No   • Drug use: No   • Sexual activity: Defer       Review of Systems   Constitutional: Negative.    HENT: Negative.     Musculoskeletal:  Positive for neck pain.       Vitals:    24 1017   BP: 149/85   Pulse: 59   Temp: 97.5 °F (36.4  °C)       Body mass index is 22.6 kg/m².    Objective     Physical Exam  Vitals reviewed.   Constitutional:       Appearance: Normal appearance. She is normal weight.   HENT:      Head: Normocephalic.      Right Ear: External ear normal. Decreased hearing noted.      Left Ear: External ear normal. Decreased hearing noted.      Nose: Nose normal.      Mouth/Throat:      Lips: Pink.   Neck:      Comments: Nodules visualized on ultrasound     Neurological:      Mental Status: She is alert.     Dr. Borges has examined and assessed the patient and agrees with current treatment plan        Assessment & Plan   Problems Addressed this Visit    None  Visit Diagnoses       Multiple thyroid nodules    -  Primary    Relevant Orders    ThyGenX(TM) With / ThyraMIR (TM) Rfx    US Thyroid    Neck pain              Diagnoses         Codes Comments    Multiple thyroid nodules    -  Primary ICD-10-CM: E04.2  ICD-9-CM: 241.1     Neck pain     ICD-10-CM: M54.2  ICD-9-CM: 723.1           * Surgery not found *  Orders Placed This Encounter   Procedures   • US Thyroid     Standing Status:   Future     Standing Expiration Date:   5/14/2025     Order Specific Question:   Reason for Exam:     Answer:   Thyroid disease     Order Specific Question:   Release to patient     Answer:   Routine Release [[id number]]     Return in about 8 weeks (around 7/9/2024) for Recheck.     Discussed surgical vs conservative options including management with serial ultrasounds vs partial thyroidectomy vs total thyroidectomy   Will order genetic testing and proceed after results   Massage for ongoing neck pain     There are no Patient Instructions on file for this visit.

## 2024-05-14 ENCOUNTER — TELEPHONE (OUTPATIENT)
Dept: OTOLARYNGOLOGY | Facility: CLINIC | Age: 84
End: 2024-05-14
Payer: MEDICARE

## 2024-05-14 ENCOUNTER — OFFICE VISIT (OUTPATIENT)
Dept: OTOLARYNGOLOGY | Facility: CLINIC | Age: 84
End: 2024-05-14
Payer: MEDICARE

## 2024-05-14 VITALS
HEART RATE: 59 BPM | SYSTOLIC BLOOD PRESSURE: 149 MMHG | TEMPERATURE: 97.5 F | BODY MASS INDEX: 22.5 KG/M2 | WEIGHT: 140 LBS | HEIGHT: 66 IN | DIASTOLIC BLOOD PRESSURE: 85 MMHG

## 2024-05-14 DIAGNOSIS — E04.2 MULTIPLE THYROID NODULES: Primary | ICD-10-CM

## 2024-05-14 DIAGNOSIS — M54.2 NECK PAIN: ICD-10-CM

## 2024-05-31 ENCOUNTER — TELEPHONE (OUTPATIENT)
Dept: OTOLARYNGOLOGY | Facility: CLINIC | Age: 84
End: 2024-05-31
Payer: MEDICARE

## 2024-05-31 NOTE — TELEPHONE ENCOUNTER
Caller: [facility]     Relationship: PT'S PHYSICAL THERAPY    Best call back number: [phone]    What is your medical concern? INCOMING CALL FROM PHYSICAL THERAPY. WANTING TO KNOW IF ITIS OK WITH DR. ROBLES IF THEY DO A TINGE UNIT ON PT'S NECK.    How long has this issue been going on? N/A    Is your provider already aware of this issue? N/A    Have you been treated for this issue? N/A

## 2024-06-07 LAB
BEAKER LAB AP INTRAOPERATIVE CONSULTATION: NORMAL
CYTO UR: NORMAL
LAB AP CASE REPORT: NORMAL
LAB AP DIAGNOSIS COMMENT: NORMAL
Lab: NORMAL
PATH REPORT.FINAL DX SPEC: NORMAL
PATH REPORT.GROSS SPEC: NORMAL

## 2024-06-10 ENCOUNTER — TELEPHONE (OUTPATIENT)
Dept: OTOLARYNGOLOGY | Facility: CLINIC | Age: 84
End: 2024-06-10
Payer: MEDICARE

## 2024-06-10 NOTE — TELEPHONE ENCOUNTER
----- Message from Maria L Bernard sent at 6/10/2024 11:10 AM CDT -----  Thygenext shows 1 percent change of malignancy

## 2024-06-21 ENCOUNTER — TELEPHONE (OUTPATIENT)
Dept: NEUROSURGERY | Facility: CLINIC | Age: 84
End: 2024-06-21
Payer: MEDICARE

## 2024-06-21 NOTE — TELEPHONE ENCOUNTER
Caller: LINDA ESCALERA     Relationship: SELF    Best call back number: [phone]    What was the call regarding: PATIENT CALLED STATING THE GARAGE DOOR CAME DOWN ON HER NECK ABOUT A WEEK AGO - STATES SHE WAS OK AT 1ST BUT THE PAIN HAS GOTTEN SO BAD SHE CAN HARDLY MOVE HER NECK  - STATES IT'S GETTING REALLY HARD TO TURN HER NECK     PATIENT HAS PT APPT TODAY AT 1015 - WANTS TO KNOW IF ITS OK TO GO AHEAD AND GO    PATIENT ALSO HAS APPT WITH [facility] ON 07/03    DENIES ANY POPPING OR CRACKING     PLEASE ADVISE  THANK YOU

## 2024-06-25 ENCOUNTER — TELEPHONE (OUTPATIENT)
Dept: HEMATOLOGY | Age: 84
End: 2024-06-25

## 2024-06-25 NOTE — TELEPHONE ENCOUNTER
Called patient to remind them of their appointment on 6/27/2024 but was unable to leave vm due to mailbox being full or not set up or memory is full. No other number in patient's chart.

## 2024-06-26 DIAGNOSIS — C50.112 MALIGNANT NEOPLASM OF CENTRAL PORTION OF LEFT BREAST IN FEMALE, ESTROGEN RECEPTOR POSITIVE (HCC): Primary | ICD-10-CM

## 2024-06-26 DIAGNOSIS — Z17.0 MALIGNANT NEOPLASM OF CENTRAL PORTION OF LEFT BREAST IN FEMALE, ESTROGEN RECEPTOR POSITIVE (HCC): Primary | ICD-10-CM

## 2024-06-26 NOTE — PROGRESS NOTES
Progress Note      Pt Name: Ya Evans  YOB: 1940  MRN: [id number]    Date of evaluation: 10/08/2024  History Obtained From:  patient, electronic medical record    CHIEF COMPLAINT:    Chief Complaint   Patient presents with    Follow-up     Malignant neoplasm of central portion of left breast in female, estrogen receptor positive      HISTORY OF PRESENT ILLNESS:    Ya Evans is a 83 y.o.  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  Current recommendation is for tamoxifen 10 mg p.o. daily for 10 years, anticipate continuing through June 2028 (she has a history of a CVA so dose reduction is appropriate). Ya returns today in follow-up for evaluation, side effect monitoring, lab monitoring and further treatment recommendations.  She was accompanied by her daughter today.     Today's clinic visit to include physical assessment, review of systems, any radiograph or lab findings that were available and plan of care are documented below.     ONCOLOGIC HISTORY:   Diagnosis   Multifocal invasive ductal carcinoma. December 2017   %, PA 99%, HER-2/mary IHC 1+/FISH not amplified   Grade 1   Stage [id number] , stage IA. AJCC 2018   Normal bone density, March 2018    Treatment summary  2 6018-Left lumpectomy/sentinel lymph node biopsy   4/24/2018 through 5/15/2018- 4256 cGy adjuvant RT   June 2018- adjuvant Femara ×5 years   September 2018-switched to tamoxifen due to poor tolerance to AI (severe joint pain).    Cancer history  Ms Ya Evans was first seen by Dr Martinez on 3/7/2018 referred by Dr. Lewis Bowling.  12/8/2017-a screening 3-D amada synthesis showed a 5 mm spiculated mass in the left breast. Diagnostic ultrasound was unremarkable.   1/3/2018-core needle biopsy revealed 2 foci consistent with invasive ductal carcinoma, grade 1 with associated

## 2024-07-03 ENCOUNTER — OFFICE VISIT (OUTPATIENT)
Dept: NEUROSURGERY | Facility: CLINIC | Age: 84
End: 2024-07-03
Payer: MEDICARE

## 2024-07-03 VITALS — WEIGHT: 140 LBS | BODY MASS INDEX: 22.5 KG/M2 | HEIGHT: 66 IN

## 2024-07-03 DIAGNOSIS — M54.12 CERVICAL RADICULOPATHY: ICD-10-CM

## 2024-07-03 DIAGNOSIS — M50.30 DEGENERATION OF CERVICAL INTERVERTEBRAL DISC: Primary | ICD-10-CM

## 2024-07-03 DIAGNOSIS — Z78.9 NONSMOKER: ICD-10-CM

## 2024-07-03 PROCEDURE — 1160F RVW MEDS BY RX/DR IN RCRD: CPT | Performed by: NURSE PRACTITIONER

## 2024-07-03 PROCEDURE — 1159F MED LIST DOCD IN RCRD: CPT | Performed by: NURSE PRACTITIONER

## 2024-07-03 PROCEDURE — 99213 OFFICE O/P EST LOW 20 MIN: CPT | Performed by: NURSE PRACTITIONER

## 2024-07-03 RX ORDER — FUROSEMIDE 20 MG/1
TABLET ORAL
COMMUNITY
Start: 2024-05-31

## 2024-07-03 RX ORDER — ALPRAZOLAM 0.25 MG/1
0.25 TABLET ORAL ONCE
Qty: 1 TABLET | Refills: 0 | Status: SHIPPED | OUTPATIENT
Start: 2024-07-03 | End: 2024-07-03

## 2024-07-03 NOTE — PATIENT INSTRUCTIONS
Advance Care Planning and Advance Directives     You make decisions on a daily basis - decisions about where you want to live, your career, your home, your life. Perhaps one of the most important decisions you face is your choice for future medical care. Take time to talk with your family and your healthcare team and start planning today.  Advance Care Planning is a process that can help you:  Understand possible future healthcare decisions in light of your own experiences  Reflect on those decision in light of your goals and values  Discuss your decisions with those closest to you and the healthcare professionals that care for you  Make a plan by creating a document that reflects your wishes    Surrogate Decision Maker  In the event of a medical emergency, which has left you unable to communicate or to make your own decisions, you would need someone to make decisions for you.  It is important to discuss your preferences for medical treatment with this person while you are in good health.     Qualities of a surrogate decision maker:  Willing to take on this role and responsibility  Knows what you want for future medical care  Willing to follow your wishes even if they don't agree with them  Able to make difficult medical decisions under stressful circumstances    Advance Directives  These are legal documents you can create that will guide your healthcare team and decision maker(s) when needed. These documents can be stored in the electronic medical record.    Living Will - a legal document to guide your care if you have a terminal condition or a serious illness and are unable to communicate. States vary by statute in document names/types, but most forms may include one or more of the following:        -  Directions regarding life-prolonging treatments        -  Directions regarding artificially provided nutrition/hydration        -  Choosing a healthcare decision maker        -  Direction regarding organ/tissue  donation    Durable Power of  for Healthcare - this document names an -in-fact to make medical decisions for you, but it may also allow this person to make personal and financial decisions for you. Please seek the advice of an  if you need this type of document.    **Advance Directives are not required and no one may discriminate against you if you do not sign one.    Medical Orders  Many states allow specific forms/orders signed by your physician to record your wishes for medical treatment in your current state of health. This form, signed in personal communication with your physician, addresses resuscitation and other medical interventions that you may or may not want.      For more information or to schedule a time with a [facility] Advance Care Planning Facilitator contact: [email or web address] or call [phone] and someone will contact you directly.

## 2024-07-03 NOTE — PROGRESS NOTES
"    Chief complaint:   Chief Complaint   Patient presents with    Neck Pain     Pt here for 6-8wk f/u. Pt states since her last office visit her symptoms have improved. Pt states she is currently in physical therapy.         Subjective     HPI: This is a 83-year-old female patient who was referred to us by Dr. Donaldo Way for neck pain and left upper extremity pain. She is here to be evaluated today. The patient says that she did start having the neck pain about 3 months ago. She does relate to having a fall but did not have an onset of neck pain initially with the fall. Currently the pain in her neck is constant. It is worse with turning. It is better with medication. She does complain of some pain radiating to her left upper extremity that is radicular all the way to her hand along with numbness and tingling in her hand. Denies any bowel or bladder incontinence. She has not done any recent chiropractic care pain management injections. She is right-hand dominant. She is retired. Denies any tobacco, alcohol, or illicit drug use. She has been taking gabapentin along with a muscle relaxer.     We did in the patient for x-rays of the cervical spine as well as a dedicated course of physical therapy at [facility] in [place].  She is here in follow-up today.  The patient says that she did go through 8 sessions of physical therapy and does feel like she has had about 40% improvement in her pain issues.  Her arm pain has completely resolved and denies any numbness or tingling.  She mainly complains of pain all in the left side of her neck and can go up into the occipital region of her head.    Review of Systems   Musculoskeletal:  Positive for arthralgias, myalgias and neck pain.   Neurological: Negative.          Objective      Vital Signs  Ht 167.6 cm (66\")   Wt 63.5 kg (140 lb)   BMI 22.60 kg/m²     Physical Exam  Constitutional:       Appearance: She is well-developed.   HENT:      Head: Normocephalic. "   Eyes:      Extraocular Movements: EOM normal.      Pupils: Pupils are equal, round, and reactive to light.   Pulmonary:      Effort: Pulmonary effort is normal.   Musculoskeletal:         General: Normal range of motion.      Cervical back: Normal range of motion.   Skin:     General: Skin is warm.   Neurological:      Mental Status: She is alert and oriented to person, place, and time.      GCS: GCS eye subscore is 4. GCS verbal subscore is 5. GCS motor subscore is 6.      Cranial Nerves: No cranial nerve deficit.      Sensory: No sensory deficit.      Motor: Motor strength is normal.     Gait: Gait is intact. Gait normal.      Deep Tendon Reflexes: Reflexes are normal and symmetric.   Psychiatric:         Speech: Speech normal.         Behavior: Behavior normal.         Thought Content: Thought content normal.         Neurologic Exam     Mental Status   Oriented to person, place, and time.   Attention: normal. Concentration: normal.   Speech: speech is normal   Level of consciousness: alert  Normal comprehension.     Cranial Nerves     CN II   Visual fields full to confrontation.     CN III, IV, VI   Pupils are equal, round, and reactive to light.  Extraocular motions are normal.     CN V   Facial sensation intact.     CN VII   Facial expression full, symmetric.     CN VIII   CN VIII normal.     CN IX, X   CN IX normal.   CN X normal.     CN XI   CN XI normal.     CN XII   CN XII normal.     Motor Exam   Muscle bulk: normal    Strength   Strength 5/5 throughout.     Sensory Exam   Light touch normal.     Gait, Coordination, and Reflexes     Gait  Gait: normal    Reflexes   Reflexes 2+ except as noted.       Imaging review: X-rays of the cervical spine that was done on April 15, 2024 shows disc degeneration at C5-6 and C6/7.  Slight motion is noted of C4-5 in flexion views but does reduce and extension        MRI of the neck that was done on April 8, 2024 shows mild right foraminal narrowing at C4-5.  At C5-6  disc degeneration with Modic endplate changes is noted with left-sided foraminal narrowing.  At C6-7 mild bilateral foraminal narrowing.  No fracture visualized.  No cord signal change.     Assessment/Plan: Patient is continue complain of neck pain.  Her radicular symptoms have resolved.   I will also order a compounding cream for the patient.  Will have her follow-up with Dr. Rey the next available appointment.  I will also make referral to pain management to try injections in her neck as well.  Her questions and concerns were addressed    Patient is a nonsmoker  The patient's Body mass index is 22.6 kg/m².. BMI is within normal parameters. No follow-up required.  Advance Care Planning   ACP discussion was held with the patient during this visit. Patient does not have an advance directive, information provided.   STEADI Fall Risk Assessment was completed, and patient is at MODERATE risk for falls. Assessment completed on:4/15/2024     Diagnoses and all orders for this visit:    1. Degeneration of cervical intervertebral disc (Primary)  -     Discontinue: ALPRAZolam (Xanax) 0.25 MG tablet; Take 1 tablet by mouth 1 (One) Time for 1 dose. Take 15 minutes before MRI  Dispense: 1 tablet; Refill: 0  -     Cancel: MRI Cervical Spine Without Contrast; Future  -     Gabapentin 6 %; Apply 1-2 g topically to the appropriate area as directed 3 (Three) to 4 (Four) times daily.  Dispense: 90 g; Refill: 2  -     Ambulatory Referral to Pain Management Clinic    2. Cervical radiculopathy  -     Cancel: MRI Cervical Spine Without Contrast; Future  -     Gabapentin 6 %; Apply 1-2 g topically to the appropriate area as directed 3 (Three) to 4 (Four) times daily.  Dispense: 90 g; Refill: 2    3. Nonsmoker    4. Body mass index (BMI) of 22.0-22.9 in adult        I discussed the patients findings and my recommendations with patient  Marcell Shah, MARY  07/03/24  12:16 CDT

## 2024-07-05 NOTE — TELEPHONE ENCOUNTER
Pt called and stated she wanted to let Marcell she forgot the garage door fell on her neck after she got her last MRI and if Marcell still wanted to order an MRI he can. Informed pt Marcell is not in office I will send him a message and if does or doesn't need the MRI I will give pt a call. Ended call with pt understanding and acknowledgment.

## 2024-07-08 NOTE — TELEPHONE ENCOUNTER
Placed a call to inform pt per Marcell pt needs to try the cream and pain mgmt injections. Ended call with pt understanding and acknowledgment.

## 2024-07-09 ENCOUNTER — OFFICE VISIT (OUTPATIENT)
Dept: OTOLARYNGOLOGY | Facility: CLINIC | Age: 84
End: 2024-07-09
Payer: MEDICARE

## 2024-07-09 VITALS
HEIGHT: 66 IN | BODY MASS INDEX: 22.18 KG/M2 | TEMPERATURE: 98 F | WEIGHT: 138 LBS | SYSTOLIC BLOOD PRESSURE: 127 MMHG | DIASTOLIC BLOOD PRESSURE: 71 MMHG | HEART RATE: 54 BPM

## 2024-07-09 DIAGNOSIS — M54.2 NECK PAIN: Primary | ICD-10-CM

## 2024-07-09 DIAGNOSIS — E04.1 THYROID NODULE: ICD-10-CM

## 2024-07-09 DIAGNOSIS — M54.12 CERVICAL RADICULOPATHY: ICD-10-CM

## 2024-07-09 PROCEDURE — 3078F DIAST BP <80 MM HG: CPT | Performed by: OTOLARYNGOLOGY

## 2024-07-09 PROCEDURE — 99213 OFFICE O/P EST LOW 20 MIN: CPT | Performed by: OTOLARYNGOLOGY

## 2024-07-09 PROCEDURE — 1160F RVW MEDS BY RX/DR IN RCRD: CPT | Performed by: OTOLARYNGOLOGY

## 2024-07-09 PROCEDURE — 3074F SYST BP LT 130 MM HG: CPT | Performed by: OTOLARYNGOLOGY

## 2024-07-09 PROCEDURE — 1159F MED LIST DOCD IN RCRD: CPT | Performed by: OTOLARYNGOLOGY

## 2024-07-09 RX ORDER — ALPRAZOLAM 0.25 MG/1
TABLET ORAL
COMMUNITY
Start: 2024-07-03

## 2024-07-09 NOTE — PROGRESS NOTES
YOB: 1940  Location: [facility]  Location Address: [address] [place] [address], [facility], [address] [place] [address]  Location Phone: [phone]    Chief Complaint   Patient presents with    Thyroid Problem    Neck Pain     Knot on left side of neck that has been there for several. Painful mostly when moving neck but sometimes painful without moving.        History of Present Illness  Coreen Wilson is a 83 y.o. female.  Coreen Wilson is here for follow up of ENT complaints. The patient has had problems with thyroid nodules. The nodules were found incidentally during work up for unrelated condition. The patient underwent fine needle aspiration 2024 pathology revealed suspicious for follicular neoplasm but genetic report revealed a less than 1% malignancy.  Patient has fatigue and weight gain.     She complains of left sided neck pain and tightness - she also is currently being treated for cervical disc disease. She has a knot of the left neck of the level v neck. She states this has been present for approximately a month. It hurts when she turns her head. The patient has had an MRI cervical spine which did not note a mass.      She was treated for breast cancer approximately 6 years ago she is on tamoxifen daily this is prescribed and followed by Dr. Joe      She is taking aspirin daily through Dr. Way       Fine Needle Aspiration: [id number]  Order: [id number]  Status: Final result       Visible to patient: No (inaccessible in MyChart)       Next appt: 2024 at 10:00 AM in Otolaryngology (Luciano Borges MD)    Specimen Information: Thyroid; Body Fluid   1 Result Note       1 Follow-up Encounter        Component     Note to Patients     This report may contain a detailed description of human tissue sent by a health care provider to the laboratory for pathologic evaluation. The content of this report is essential for diagnosis and may provide important critical findings. This information  may be unfamiliar to patients to review without a medical professional present. It is advised that the patient review this report in the presence of a health care provider who can answer questions and explain the results.   Case Report   Medical Cytology Report                           Case: [id number]                                 Authorizing Provider:  Jag Puente APRN      Collected:           2024 11:40 AM           Ordering Location:     [facility]  Received:            2024 12:13 PM           Pathologist:           Jerrod Jessica MD                                                         Specimen:    Thyroid, left                                                                               Final Diagnosis   Thyroid, left lobe, fine-needle aspiration:  [place] category IV: Suspicious for follicular neoplasm.   Electronically signed by Jerrod Jessica MD on 2024 at 0843                          Past Medical History:   Diagnosis Date    Breast cancer     CHF (congestive heart failure)     Coronary artery disease     Hyperlipidemia     Hypertension     Stroke        Past Surgical History:   Procedure Laterality Date    BREAST LUMPECTOMY      CARPAL TUNNEL RELEASE       SECTION      ENDOSCOPY         Outpatient Medications Marked as Taking for the 24 encounter (Office Visit) with Luciano Borges MD   Medication Sig Dispense Refill    ALPRAZolam (XANAX) 0.25 MG tablet        Current Facility-Administered Medications for the 24 encounter (Office Visit) with Luciano Borges MD   Medication Dose Route Frequency Provider Last Rate Last Admin    lidocaine (XYLOCAINE) 1 % injection 5 mL  5 mL Infiltration Once Leif Roa, DO           Lorazepam    Family History   Problem Relation Age of Onset    Arrhythmia Mother     Cancer Father        Social History     Socioeconomic History    Marital status:     Number of children: 4   Tobacco  Use    Smoking status: Never    Smokeless tobacco: Never   Vaping Use    Vaping status: Never Used   Substance and Sexual Activity    Alcohol use: No    Drug use: No    Sexual activity: Defer       Review of Systems   Constitutional:  Positive for fatigue and unexpected weight change.   HENT: Negative.     Eyes: Negative.    Respiratory: Negative.     Cardiovascular: Negative.    Gastrointestinal: Negative.    Endocrine: Negative.    Genitourinary: Negative.    Musculoskeletal:  Positive for myalgias, neck pain and neck stiffness.        Level v nodule of neck   Allergic/Immunologic: Negative.    Neurological: Negative.    Hematological: Negative.    Psychiatric/Behavioral: Negative.         Vitals:    07/09/24 1354   BP: 127/71   Pulse: 54   Temp: 98 °F (36.7 °C)       Body mass index is 22.27 kg/m².    Objective     Physical Exam        CONSTITUTIONAL: well nourished, well-developed, alert, oriented, in no acute distress     COMMUNICATION AND VOICE: able to communicate normally, normal voice quality    HEAD: normocephalic, no lesions, atraumatic, no tenderness, no masses     FACE: appearance normal, no lesions, no tenderness, no deformities, facial motion symmetric    EYES: ocular motility normal, eyelids normal, orbits normal, no proptosis, conjunctiva normal , pupils equal, round     EARS:  Hearing: hearing to conversational voice intact bilaterally   External Ears: normal bilaterally, no lesions    NOSE:  External Nose: external nasal structure normal, no tenderness on palpation, no nasal discharge, no lesions, no evidence of trauma, nostrils patent     ORAL:  Lips: upper and lower lips without lesion     NECK:  Inspection and Palpation: neck appearance normal, no masses or tenderness in the anterior neck and no thyroid masses appreciated  The left level 5 there is what is appeared to be consistent with a trigger point in the anterior trapezius    CHEST/RESPIRATORY: normal respiratory effort     CARDIOVASCULAR:  no cyanosis or edema     NEUROLOGICAL/PSYCHIATRIC: oriented to time, place and person, mood normal, affect appropriate, CN II-XII intact grossly     Assessment & Plan   Diagnoses and all orders for this visit:    1. Neck pain (Primary)  -     US Head Neck Soft Tissue; Future  -     US Thyroid; Future    2. Thyroid nodule  Comments:  Follicular neoplasm  Orders:  -     US Thyroid; Future    3. Cervical radiculopathy  -     US Thyroid; Future      * Surgery not found *  Orders Placed This Encounter   Procedures    US Head Neck Soft Tissue     Standing Status:   Future     Number of Occurrences:   1     Standing Expiration Date:   7/9/2025     Order Specific Question:   Reason for Exam:     Answer:   nodule of neck     Order Specific Question:   Release to patient     Answer:   Routine Release [[id number]]    US Thyroid     Standing Status:   Future     Standing Expiration Date:   7/9/2025     Order Specific Question:   Reason for Exam:     Answer:   Thyroid disease     Order Specific Question:   Release to patient     Answer:   Routine Release [[id number]]     No follow-ups on file.       Patient Instructions   The patient has a thyroid nodule, which is relatively small, and studies do not suggest a malignancy. I have recommended observation with follow-up with me for repeat ultrasound. I explained the pathology of thyroid nodules including the risks of cancer. The options of surgery were discussed, but the patient wants to pursue an observational course for now, which is reasonable. The patient wishes to continue to defer surgery at this time.      Repeat thyroid ultrasound in 6 months    Thyroid ultrasound today to rule out mass in the left posterior neck

## 2024-07-09 NOTE — PATIENT INSTRUCTIONS
The patient has a thyroid nodule, which is relatively small, and studies do not suggest a malignancy. I have recommended observation with follow-up with me for repeat ultrasound. I explained the pathology of thyroid nodules including the risks of cancer. The options of surgery were discussed, but the patient wants to pursue an observational course for now, which is reasonable. The patient wishes to continue to defer surgery at this time.      Repeat thyroid ultrasound in 6 months    Thyroid ultrasound today to rule out mass in the left posterior neck

## 2024-09-17 ENCOUNTER — TELEPHONE (OUTPATIENT)
Dept: NEUROSURGERY | Facility: CLINIC | Age: 84
End: 2024-09-17
Payer: MEDICARE

## 2024-09-17 ENCOUNTER — OFFICE VISIT (OUTPATIENT)
Dept: NEUROSURGERY | Facility: CLINIC | Age: 84
End: 2024-09-17
Payer: MEDICARE

## 2024-09-17 VITALS — WEIGHT: 142 LBS | BODY MASS INDEX: 22.82 KG/M2 | HEIGHT: 66 IN

## 2024-09-17 DIAGNOSIS — M50.30 DEGENERATION OF CERVICAL INTERVERTEBRAL DISC: ICD-10-CM

## 2024-09-17 DIAGNOSIS — Z78.9 NONSMOKER: ICD-10-CM

## 2024-09-17 DIAGNOSIS — M54.12 CERVICAL RADICULOPATHY: Primary | ICD-10-CM

## 2024-09-17 PROCEDURE — 99213 OFFICE O/P EST LOW 20 MIN: CPT | Performed by: NEUROLOGICAL SURGERY

## 2024-09-17 PROCEDURE — 1160F RVW MEDS BY RX/DR IN RCRD: CPT | Performed by: NEUROLOGICAL SURGERY

## 2024-09-17 PROCEDURE — 1159F MED LIST DOCD IN RCRD: CPT | Performed by: NEUROLOGICAL SURGERY

## 2024-09-17 RX ORDER — CYCLOBENZAPRINE HCL 5 MG
5 TABLET ORAL 3 TIMES DAILY PRN
Qty: 90 TABLET | Refills: 0 | Status: SHIPPED | OUTPATIENT
Start: 2024-09-17

## 2024-10-07 ENCOUNTER — TELEPHONE (OUTPATIENT)
Dept: HEMATOLOGY | Age: 84
End: 2024-10-07

## 2024-10-07 NOTE — TELEPHONE ENCOUNTER
Voicemail box full at [phone] to remind of appointment with Jayda Jackson 10/8/2024 at 11:00am at the [facility]

## 2024-10-08 ENCOUNTER — TRANSCRIBE ORDERS (OUTPATIENT)
Dept: ADMINISTRATIVE | Facility: HOSPITAL | Age: 84
End: 2024-10-08
Payer: MEDICARE

## 2024-10-08 ENCOUNTER — HOSPITAL ENCOUNTER (OUTPATIENT)
Dept: INFUSION THERAPY | Age: 84
Discharge: HOME | End: 2024-10-08
Payer: MEDICARE

## 2024-10-08 ENCOUNTER — OFFICE VISIT (OUTPATIENT)
Dept: HEMATOLOGY | Age: 84
End: 2024-10-08
Payer: MEDICARE

## 2024-10-08 VITALS
DIASTOLIC BLOOD PRESSURE: 70 MMHG | HEIGHT: 66 IN | WEIGHT: 144 LBS | HEART RATE: 54 BPM | TEMPERATURE: 97.7 F | OXYGEN SATURATION: 98 % | BODY MASS INDEX: 23.14 KG/M2 | SYSTOLIC BLOOD PRESSURE: 118 MMHG

## 2024-10-08 DIAGNOSIS — Z79.810 ENCOUNTER FOR MONITORING TAMOXIFEN THERAPY: ICD-10-CM

## 2024-10-08 DIAGNOSIS — Z78.0 POSTMENOPAUSAL STATE: Primary | ICD-10-CM

## 2024-10-08 DIAGNOSIS — C50.112 MALIGNANT NEOPLASM OF CENTRAL PORTION OF LEFT BREAST IN FEMALE, ESTROGEN RECEPTOR POSITIVE (HCC): Primary | ICD-10-CM

## 2024-10-08 DIAGNOSIS — C50.112 MALIGNANT NEOPLASM OF CENTRAL PORTION OF LEFT BREAST IN FEMALE, ESTROGEN RECEPTOR POSITIVE (HCC): ICD-10-CM

## 2024-10-08 DIAGNOSIS — Z17.0 MALIGNANT NEOPLASM OF CENTRAL PORTION OF LEFT BREAST IN FEMALE, ESTROGEN RECEPTOR POSITIVE (HCC): ICD-10-CM

## 2024-10-08 DIAGNOSIS — Z17.0 MALIGNANT NEOPLASM OF CENTRAL PORTION OF LEFT BREAST IN FEMALE, ESTROGEN RECEPTOR POSITIVE (HCC): Primary | ICD-10-CM

## 2024-10-08 DIAGNOSIS — Z51.81 ENCOUNTER FOR MONITORING TAMOXIFEN THERAPY: ICD-10-CM

## 2024-10-08 LAB
ALBUMIN SERPL-MCNC: 4.2 G/DL (ref 3.5–5.2)
ALP SERPL-CCNC: 53 U/L (ref 35–104)
ALT SERPL-CCNC: 10 U/L (ref 5–33)
ANION GAP SERPL CALCULATED.3IONS-SCNC: 9 MMOL/L (ref 7–19)
AST SERPL-CCNC: 21 U/L (ref 5–32)
BASOPHILS # BLD: 0.04 K/UL (ref 0.01–0.08)
BASOPHILS NFR BLD: 0.7 % (ref 0.1–1.2)
BILIRUB SERPL-MCNC: 0.4 MG/DL (ref 0.2–1.2)
BUN SERPL-MCNC: 25 MG/DL (ref 8–23)
CALCIUM SERPL-MCNC: 9.1 MG/DL (ref 8.8–10.2)
CHLORIDE SERPL-SCNC: 103 MMOL/L (ref 98–111)
CO2 SERPL-SCNC: 29 MMOL/L (ref 22–29)
CREAT SERPL-MCNC: 1.2 MG/DL (ref 0.5–0.9)
EOSINOPHIL # BLD: 0.18 K/UL (ref 0.04–0.54)
EOSINOPHIL NFR BLD: 3.1 % (ref 0.7–7)
ERYTHROCYTE [DISTWIDTH] IN BLOOD BY AUTOMATED COUNT: 13.2 % (ref 11.7–14.4)
GLUCOSE SERPL-MCNC: 97 MG/DL (ref 70–99)
HCT VFR BLD AUTO: 34.9 % (ref 34.1–44.9)
HGB BLD-MCNC: 11.8 G/DL (ref 11.2–15.7)
LYMPHOCYTES # BLD: 2.08 K/UL (ref 1.18–3.74)
LYMPHOCYTES NFR BLD: 36.3 % (ref 19.3–53.1)
MCH RBC QN AUTO: 28.9 PG (ref 25.6–32.2)
MCHC RBC AUTO-ENTMCNC: 33.8 G/DL (ref 32.3–35.5)
MCV RBC AUTO: 85.3 FL (ref 79.4–94.8)
MONOCYTES # BLD: 0.45 K/UL (ref 0.24–0.82)
MONOCYTES NFR BLD: 7.9 % (ref 4.7–12.5)
NEUTROPHILS # BLD: 2.97 K/UL (ref 1.56–6.13)
NEUTS SEG NFR BLD: 51.8 % (ref 34–71.1)
PLATELET # BLD AUTO: 151 K/UL (ref 182–369)
PMV BLD AUTO: 11.7 FL (ref 7.4–10.4)
POTASSIUM SERPL-SCNC: 4.8 MMOL/L (ref 3.5–5)
PROT SERPL-MCNC: 6.3 G/DL (ref 6.4–8.3)
RBC # BLD AUTO: 4.09 M/UL (ref 3.93–5.22)
SODIUM SERPL-SCNC: 141 MMOL/L (ref 136–145)
WBC # BLD AUTO: 5.73 K/UL (ref 3.98–10.04)

## 2024-10-08 PROCEDURE — 1123F ACP DISCUSS/DSCN MKR DOCD: CPT | Performed by: NURSE PRACTITIONER

## 2024-10-08 PROCEDURE — 36415 COLL VENOUS BLD VENIPUNCTURE: CPT

## 2024-10-08 PROCEDURE — 1036F TOBACCO NON-USER: CPT | Performed by: NURSE PRACTITIONER

## 2024-10-08 PROCEDURE — G8399 PT W/DXA RESULTS DOCUMENT: HCPCS | Performed by: NURSE PRACTITIONER

## 2024-10-08 PROCEDURE — 1090F PRES/ABSN URINE INCON ASSESS: CPT | Performed by: NURSE PRACTITIONER

## 2024-10-08 PROCEDURE — 99214 OFFICE O/P EST MOD 30 MIN: CPT | Performed by: NURSE PRACTITIONER

## 2024-10-08 PROCEDURE — G8420 CALC BMI NORM PARAMETERS: HCPCS | Performed by: NURSE PRACTITIONER

## 2024-10-08 PROCEDURE — 85025 COMPLETE CBC W/AUTO DIFF WBC: CPT

## 2024-10-08 PROCEDURE — G8427 DOCREV CUR MEDS BY ELIG CLIN: HCPCS | Performed by: NURSE PRACTITIONER

## 2024-10-08 PROCEDURE — 36415 COLL VENOUS BLD VENIPUNCTURE: CPT | Performed by: NURSE PRACTITIONER

## 2024-10-08 PROCEDURE — G8484 FLU IMMUNIZE NO ADMIN: HCPCS | Performed by: NURSE PRACTITIONER

## 2024-10-08 PROCEDURE — 99213 OFFICE O/P EST LOW 20 MIN: CPT

## 2024-10-08 RX ORDER — LETROZOLE 2.5 MG/1
2.5 TABLET, FILM COATED ORAL DAILY
Qty: 30 TABLET | Refills: 1 | Status: SHIPPED | OUTPATIENT
Start: 2024-10-08

## 2024-10-08 RX ORDER — TAMOXIFEN CITRATE 10 MG/1
TABLET ORAL
Qty: 90 TABLET | Refills: 3 | Status: SHIPPED | OUTPATIENT
Start: 2024-10-08

## 2024-10-14 ASSESSMENT — ENCOUNTER SYMPTOMS
EYE PAIN: 0
BACK PAIN: 0
COUGH: 0
EYES NEGATIVE: 1
BLOOD IN STOOL: 0
GASTROINTESTINAL NEGATIVE: 1
ABDOMINAL PAIN: 0
CONSTIPATION: 0
DIARRHEA: 0
EYE REDNESS: 0
NAUSEA: 0
WHEEZING: 0
VOMITING: 0
SORE THROAT: 0
RESPIRATORY NEGATIVE: 1
EYE DISCHARGE: 0
SHORTNESS OF BREATH: 0

## 2024-10-22 ENCOUNTER — HOSPITAL ENCOUNTER (OUTPATIENT)
Dept: WOMENS IMAGING | Age: 84
Discharge: HOME | End: 2024-10-22
Payer: MEDICARE

## 2024-10-22 DIAGNOSIS — Z78.0 POSTMENOPAUSAL STATE: ICD-10-CM

## 2024-10-22 PROCEDURE — 77080 DXA BONE DENSITY AXIAL: CPT

## 2024-10-23 DIAGNOSIS — M50.30 DEGENERATION OF CERVICAL INTERVERTEBRAL DISC: ICD-10-CM

## 2024-10-23 DIAGNOSIS — M54.12 CERVICAL RADICULOPATHY: ICD-10-CM

## 2024-11-27 DIAGNOSIS — M54.12 CERVICAL RADICULOPATHY: ICD-10-CM

## 2024-11-27 DIAGNOSIS — M50.30 DEGENERATION OF CERVICAL INTERVERTEBRAL DISC: ICD-10-CM

## 2024-12-17 ENCOUNTER — OFFICE VISIT (OUTPATIENT)
Dept: NEUROSURGERY | Facility: CLINIC | Age: 84
End: 2024-12-17
Payer: MEDICARE

## 2024-12-17 VITALS — HEIGHT: 66 IN | WEIGHT: 142 LBS | BODY MASS INDEX: 22.82 KG/M2

## 2024-12-17 DIAGNOSIS — M50.30 DEGENERATION OF CERVICAL INTERVERTEBRAL DISC: ICD-10-CM

## 2024-12-17 DIAGNOSIS — M54.12 CERVICAL RADICULOPATHY: Primary | ICD-10-CM

## 2024-12-17 DIAGNOSIS — Z78.9 NONSMOKER: ICD-10-CM

## 2024-12-17 PROCEDURE — 99213 OFFICE O/P EST LOW 20 MIN: CPT | Performed by: NURSE PRACTITIONER

## 2024-12-17 PROCEDURE — 1159F MED LIST DOCD IN RCRD: CPT | Performed by: NURSE PRACTITIONER

## 2024-12-17 PROCEDURE — 1160F RVW MEDS BY RX/DR IN RCRD: CPT | Performed by: NURSE PRACTITIONER

## 2024-12-17 RX ORDER — LETROZOLE 2.5 MG/1
1 TABLET, FILM COATED ORAL DAILY
COMMUNITY
Start: 2024-10-08

## 2024-12-17 RX ORDER — LISINOPRIL 5 MG/1
5 TABLET ORAL DAILY
COMMUNITY
Start: 2024-12-16

## 2024-12-17 RX ORDER — RANOLAZINE 500 MG/1
1 TABLET, EXTENDED RELEASE ORAL EVERY 12 HOURS SCHEDULED
COMMUNITY
Start: 2024-12-02

## 2024-12-17 NOTE — PROGRESS NOTES
"    Chief complaint:   Chief Complaint   Patient presents with    Neck Pain     Pt is here for 3mo f/u. Pt states since her last office visit her symptoms have  improved.         Subjective     HPI: This is a 84 year old female patient who we have been following for neck pain. She does have known degeneration and stenosis at C5-6 and C6-7. She has been working with conservative care.  She is been through physical therapy and had some improvement.  She is also been working with pain management did not notice a significant improvement with the injection treatment.  She has been doing the compounding cream and the diclofenac and Flexeril and if like this has made her symptoms more manageable at this time.  She does still have some neck pain but overall feels like things are stable at this point.  Her arm pain has resolved at this time    Review of Systems   Musculoskeletal:  Positive for arthralgias, myalgias and neck pain.   Neurological: Negative.          Objective      Vital Signs  Ht 167.6 cm (66\")   Wt 64.4 kg (142 lb)   BMI 22.92 kg/m²     Physical Exam  Constitutional:       General: She is awake.      Appearance: She is well-developed.   HENT:      Head: Normocephalic.   Eyes:      Extraocular Movements: Extraocular movements intact.      Pupils: Pupils are equal, round, and reactive to light.   Pulmonary:      Effort: Pulmonary effort is normal.   Musculoskeletal:         General: Normal range of motion.      Cervical back: Normal range of motion.   Skin:     General: Skin is warm.   Neurological:      Mental Status: She is alert and oriented to person, place, and time.      GCS: GCS eye subscore is 4. GCS verbal subscore is 5. GCS motor subscore is 6.      Cranial Nerves: No cranial nerve deficit.      Sensory: No sensory deficit.      Motor: Motor strength is normal.     Gait: Gait normal.      Deep Tendon Reflexes: Reflexes are normal and symmetric.   Psychiatric:         Speech: Speech normal.         " Behavior: Behavior normal.         Thought Content: Thought content normal.         Neurological Exam  Mental Status  Awake and alert. Oriented to person, place and time. Oriented to person, place, and time. Speech is normal. Language is fluent with no aphasia. Attention and concentration are normal.    Cranial Nerves  CN I: Sense of smell is normal.  CN II: Right normal visual field. Left normal visual field.  CN III, IV, VI: Extraocular movements intact bilaterally. Pupils equal round and reactive to light bilaterally.  CN V: Facial sensation is normal.  CN VII:  Right: There is no facial weakness.  Left: There is no facial weakness.  CN XI: Shoulder shrug strength is normal.  CN XII: Tongue midline without atrophy or fasciculations.    Motor  Normal muscle bulk throughout. Normal muscle tone. Strength is 5/5 throughout all four extremities.    Sensory  Sensation is intact to light touch, pinprick, vibration and proprioception in all four extremities.    Reflexes  Deep tendon reflexes are 2+ and symmetric in all four extremities.    Gait  Normal casual, toe, heel and tandem gait. Normal gait.      Imaging review: no new imaging        Assessment/Plan: The is doing well overall her symptoms are improved and stable.  At this time we will plan to see her back in the office in 3 months.  She was told to call us if any worsening pain issues.  She can continue with the diclofenac and Flexeril at this point.    Patient is a nonsmoker  The patient's Body mass index is 22.92 kg/m².. BMI is within normal parameters. No follow-up required.  Advance Care Planning   ACP discussion was held with the patient during this visit. Patient does not have an advance directive, information provided.   STEADI Fall Risk Assessment was completed, and patient is at MODERATE risk for falls. Assessment completed on:4/15/2024     Diagnoses and all orders for this visit:    1. Cervical radiculopathy (Primary)    2. Degeneration of cervical  intervertebral disc    3. Body mass index (BMI) of 22.0-22.9 in adult    4. Nonsmoker        I discussed the patients findings and my recommendations with patient  Marcell Shah, APRN  12/17/24  15:31 CST

## 2024-12-17 NOTE — PATIENT INSTRUCTIONS
Advance Care Planning and Advance Directives     You make decisions on a daily basis - decisions about where you want to live, your career, your home, your life. Perhaps one of the most important decisions you face is your choice for future medical care. Take time to talk with your family and your healthcare team and start planning today.  Advance Care Planning is a process that can help you:  Understand possible future healthcare decisions in light of your own experiences  Reflect on those decision in light of your goals and values  Discuss your decisions with those closest to you and the healthcare professionals that care for you  Make a plan by creating a document that reflects your wishes    Surrogate Decision Maker  In the event of a medical emergency, which has left you unable to communicate or to make your own decisions, you would need someone to make decisions for you.  It is important to discuss your preferences for medical treatment with this person while you are in good health.     Qualities of a surrogate decision maker:  Willing to take on this role and responsibility  Knows what you want for future medical care  Willing to follow your wishes even if they don't agree with them  Able to make difficult medical decisions under stressful circumstances    Advance Directives  These are legal documents you can create that will guide your healthcare team and decision maker(s) when needed. These documents can be stored in the electronic medical record.    Living Will - a legal document to guide your care if you have a terminal condition or a serious illness and are unable to communicate. States vary by statute in document names/types, but most forms may include one or more of the following:        -  Directions regarding life-prolonging treatments        -  Directions regarding artificially provided nutrition/hydration        -  Choosing a healthcare decision maker        -  Direction regarding organ/tissue  donation    Durable Power of  for Healthcare - this document names an -in-fact to make medical decisions for you, but it may also allow this person to make personal and financial decisions for you. Please seek the advice of an  if you need this type of document.    **Advance Directives are not required and no one may discriminate against you if you do not sign one.    Medical Orders  Many states allow specific forms/orders signed by your physician to record your wishes for medical treatment in your current state of health. This form, signed in personal communication with your physician, addresses resuscitation and other medical interventions that you may or may not want.      For more information or to schedule a time with a [facility] Advance Care Planning Facilitator contact: [email or web address] or call [phone] and someone will contact you directly.

## 2024-12-23 DIAGNOSIS — C50.112 MALIGNANT NEOPLASM OF CENTRAL PORTION OF LEFT BREAST IN FEMALE, ESTROGEN RECEPTOR POSITIVE (HCC): ICD-10-CM

## 2024-12-23 DIAGNOSIS — Z17.0 MALIGNANT NEOPLASM OF CENTRAL PORTION OF LEFT BREAST IN FEMALE, ESTROGEN RECEPTOR POSITIVE (HCC): ICD-10-CM

## 2024-12-23 RX ORDER — TAMOXIFEN CITRATE 10 MG/1
TABLET ORAL
Qty: 90 TABLET | Refills: 3 | Status: SHIPPED | OUTPATIENT
Start: 2024-12-23

## 2025-01-22 ENCOUNTER — OFFICE VISIT (OUTPATIENT)
Dept: OTOLARYNGOLOGY | Facility: CLINIC | Age: 85
End: 2025-01-22
Payer: MEDICARE

## 2025-01-22 VITALS
WEIGHT: 142 LBS | SYSTOLIC BLOOD PRESSURE: 150 MMHG | BODY MASS INDEX: 22.82 KG/M2 | DIASTOLIC BLOOD PRESSURE: 70 MMHG | TEMPERATURE: 98.4 F | HEIGHT: 66 IN | HEART RATE: 66 BPM

## 2025-01-22 DIAGNOSIS — E04.1 THYROID NODULE: ICD-10-CM

## 2025-01-22 DIAGNOSIS — E04.2 MULTIPLE THYROID NODULES: Primary | ICD-10-CM

## 2025-01-22 RX ORDER — NITROGLYCERIN 0.4 MG/1
TABLET SUBLINGUAL
COMMUNITY
Start: 2025-01-08

## 2025-01-22 RX ORDER — AMLODIPINE BESYLATE 5 MG/1
1 TABLET ORAL DAILY
COMMUNITY
Start: 2025-01-04

## 2025-01-22 NOTE — PATIENT INSTRUCTIONS
CONTACT INFORMATION:  The main office phone number is [phone]. For emergencies after hours and on weekends, this number will convert over to our answering service and the on call provider will answer. Please try to keep non emergent phone calls/ questions to office hours 9am-5pm Monday through Friday.      [employer]BaoTech  As an alternative, you can sign up and use the Epic MyChart system for more direct and quicker access for non emergent questions/ problems.  TextPower allows you to send messages to your doctor, view your test results, renew your prescriptions, schedule appointments, and more. To sign up, go to [employer] and click on the Sign Up Now link in the New User? box. Enter your [employer] Activation Code exactly as it appears below along with the last four digits of your Social Security Number and your Date of Birth () to complete the sign-up process. If you do not sign up before the expiration date, you must request a new code.     e[employer]aoTech Activation Code: Activation code not generated  Current eBaoTech Status: Active     If you have questions, you can email [email or web address] or call [phone] to talk to our [employer] staff. Remember, eBaoTech is NOT to be used for urgent needs. For medical emergencies, dial 911.     IF YOU SMOKE OR USE TOBACCO PLEASE READ THE FOLLOWING:  Why is smoking bad for me?  Smoking increases the risk of heart disease, lung disease, vascular disease, stroke, and cancer. If you smoke, STOP!        IF YOU SMOKE OR USE TOBACCO PLEASE READ THE FOLLOWING:  Why is smoking bad for me?  Smoking increases the risk of heart disease, lung disease, vascular disease, stroke, and cancer. If you smoke, STOP!     For more information:  Quit Now [place]  -QUIT-NOW  https://[email or web address].quitlogix.org/en-[place]/

## 2025-01-22 NOTE — PROGRESS NOTES
YOB: 1940  Location: [facility]  Location Address: [address] [place] [address],  [address], [address] [place] [address]  Location Phone: [phone]    Chief Complaint   Patient presents with    Thyroid Problem       History of Present Illness  Coreen Wilson is a 84 y.o. female.  Coreen Wilson is here for follow up of ENT complaints. The patient has had problems with thyroid nodules. The nodules were found incidentally during work up for unrelated condition. The patient underwent fine needle aspiration 2024 pathology revealed suspicious for follicular neoplasm but genetic report revealed a less than 1% malignancy.  Her last TSH was 1.480 on 2024.  She does not take thyroid replacement therapy.  She denies hoarseness and dysphagia today.    Reviewed:     Study Result    Narrative & Impression   US HEAD NECK SOFT TISSUE- 2024 1:57 PM     REASON FOR EXAM: nodule of neck; M54.2-Cervicalgia       COMPARISON: None available      TECHNIQUE: Multiple longitudinal and transverse real-time sonographic  images of the soft tissues of the left lateral posterior neck are  obtained. Report and images stored per institutional and state  regulations.     FINDINGS:        In the palpable area there is a small lymph node measuring 0.3 cm in  short axis. No fatty hilum is seen. Another small lymph node measures  0.4 cm in short axis.        IMPRESSION:  1. 2 lymph nodes in the soft tissues of the palpable area of the left  lateral posterior neck. Lymph nodes do not appear enlarged however fatty  boris are replaced. These may be reactive in etiology. Clinical follow-up  is recommended.        This report was signed and finalized on 2024 3:56 PM by Maycol Moya.        Past Medical History:   Diagnosis Date    Breast cancer     CHF (congestive heart failure)     Coronary artery disease     Hyperlipidemia     Hypertension     Stroke        Past Surgical History:   Procedure Laterality Date    BREAST LUMPECTOMY       CARPAL TUNNEL RELEASE       SECTION      ENDOSCOPY         Outpatient Medications Marked as Taking for the 25 encounter (Office Visit) with Jag Puente APRN   Medication Sig Dispense Refill    ALPRAZolam (XANAX) 0.25 MG tablet       amLODIPine (NORVASC) 5 MG tablet Take 1 tablet by mouth Daily.      aspirin 81 MG chewable tablet Chew 1 tablet Daily.      atorvastatin (LIPITOR) 80 MG tablet Take 1 tablet by mouth Daily.      BIOTIN PO Take 10,000 mcg by mouth.      calcium carbonate (OS-BALJEET) 600 MG tablet Take 1 tablet by mouth Daily.      gabapentin (NEURONTIN) 100 MG capsule Take  by mouth As Needed.      nitroglycerin (NITROSTAT) 0.4 MG SL tablet PLACE 1 TABLET UNDER THE TONGUE AND ALLOW TO DISSOLVE. CALL 911 AS NEEDED      pantoprazole (PROTONIX) 40 MG EC tablet Take 1 tablet by mouth 2 (Two) Times a Day.      potassium chloride (K-DUR,KLOR-CON) 20 MEQ CR tablet Take 1 tablet by mouth Daily.       Current Facility-Administered Medications for the 25 encounter (Office Visit) with Jag Puente APRN   Medication Dose Route Frequency Provider Last Rate Last Admin    lidocaine (XYLOCAINE) 1 % injection 5 mL  5 mL Infiltration Once Leif Roa S, DO           Lorazepam    Family History   Problem Relation Age of Onset    Arrhythmia Mother     Cancer Father        Social History     Socioeconomic History    Marital status:     Number of children: 4   Tobacco Use    Smoking status: Never    Smokeless tobacco: Never   Vaping Use    Vaping status: Never Used   Substance and Sexual Activity    Alcohol use: No    Drug use: No    Sexual activity: Defer       Review of Systems   Constitutional: Negative.    HENT: Negative.     Neurological: Negative.        Vitals:    25 1400   BP: 150/70   Pulse: 66   Temp: 98.4 °F (36.9 °C)       Body mass index is 22.92 kg/m².    Objective     Physical Exam  Vitals reviewed.   Constitutional:       Appearance: Normal appearance. She is normal  weight.   HENT:      Head: Normocephalic.      Right Ear: External ear normal. Decreased hearing noted.      Left Ear: External ear normal. Decreased hearing noted.      Nose: Nose normal.      Mouth/Throat:      Lips: Pink.      Mouth: Mucous membranes are moist.      Pharynx: Oropharynx is clear.   Neck:      Trachea: Trachea normal.      Comments: Nodules visualized on ultrasound     Musculoskeletal:      Cervical back: Full passive range of motion without pain.   Lymphadenopathy:      Cervical: No cervical adenopathy.   Neurological:      Mental Status: She is alert.   Psychiatric:         Behavior: Behavior is cooperative.         Assessment & Plan   Diagnoses and all orders for this visit:    1. Multiple thyroid nodules (Primary)    2. Thyroid nodule      * Surgery not found *  No orders of the defined types were placed in this encounter.    Patient will get thyroid labs through PCP  Thyroid ultrasound in 1 year  Return for problems    Return in about 1 year (around 2026) for Recheck, with ultrasound.       Patient Instructions   CONTACT INFORMATION:  The main office phone number is [phone]. For emergencies after hours and on weekends, this number will convert over to our answering service and the on call provider will answer. Please try to keep non emergent phone calls/ questions to office hours 9am-5pm Monday through Friday.      Looklet  As an alternative, you can sign up and use the Epic MyChart system for more direct and quicker access for non emergent questions/ problems.  [facility] Looklet allows you to send messages to your doctor, view your test results, renew your prescriptions, schedule appointments, and more. To sign up, go to [employer] and click on the Sign Up Now link in the New User? box. Enter your Looklet Activation Code exactly as it appears below along with the last four digits of your Social Security Number and your Date of Birth () to complete the sign-up  process. If you do not sign up before the expiration date, you must request a new code.     [place] [employer] Activation Code: Activation code not generated  Current Citizengine Status: Active     If you have questions, you can email [email or web address] or call [phone] to talk to our [facility] staff. Remember, MyChart is NOT to be used for urgent needs. For medical emergencies, dial 911.     IF YOU SMOKE OR USE TOBACCO PLEASE READ THE FOLLOWING:  Why is smoking bad for me?  Smoking increases the risk of heart disease, lung disease, vascular disease, stroke, and cancer. If you smoke, STOP!        IF YOU SMOKE OR USE TOBACCO PLEASE READ THE FOLLOWING:  Why is smoking bad for me?  Smoking increases the risk of heart disease, lung disease, vascular disease, stroke, and cancer. If you smoke, STOP!     For more information:  Quit Now [place]  1-800-QUIT-NOW  https://[email or web address].quitlogix.org/en-US/

## 2025-02-05 ENCOUNTER — TELEPHONE (OUTPATIENT)
Dept: NEUROSURGERY | Facility: CLINIC | Age: 85
End: 2025-02-05
Payer: MEDICARE

## 2025-02-05 DIAGNOSIS — M54.12 CERVICAL RADICULOPATHY: ICD-10-CM

## 2025-02-05 DIAGNOSIS — M50.30 DEGENERATION OF CERVICAL INTERVERTEBRAL DISC: ICD-10-CM

## 2025-02-05 NOTE — TELEPHONE ENCOUNTER
Pt lvm stating she would like a refill on her meds leaving the RX number.    Placed a call to ask pt which meds she needs refilled. Per pt gabapentin 6% cream sent in to [facility]. Informed pt I would send Marcell a message. Ended call with pt understanding and acknowledgement.

## 2025-02-13 ENCOUNTER — TELEPHONE (OUTPATIENT)
Dept: HEMATOLOGY | Age: 85
End: 2025-02-13

## 2025-02-13 NOTE — TELEPHONE ENCOUNTER
Called patient to remind them of their appointment on 02/18/2025 but was unable to leave  due to mailbox being full or not set up or memory is full.  Called Patient and reminded patient of their appointment on 02/18/2025 and patient confirmed they would be here. Reminded patient to just come at appointment time, and to not come at the lab appointment time. Reminded patient that we will not check them in any more than 30 minutes before appointment time.  We have now moved to the [facility] that is located between our old office and the ER at the [facility]. Letting the Pt know that our front entrance faces the  [employer] fields. Reminded pt to eat well and be well hydrated for their labs.

## 2025-02-17 DIAGNOSIS — Z17.0 MALIGNANT NEOPLASM OF CENTRAL PORTION OF LEFT BREAST IN FEMALE, ESTROGEN RECEPTOR POSITIVE (HCC): Primary | ICD-10-CM

## 2025-02-17 DIAGNOSIS — C50.112 MALIGNANT NEOPLASM OF CENTRAL PORTION OF LEFT BREAST IN FEMALE, ESTROGEN RECEPTOR POSITIVE (HCC): Primary | ICD-10-CM

## 2025-02-18 ENCOUNTER — OFFICE VISIT (OUTPATIENT)
Dept: HEMATOLOGY | Age: 85
End: 2025-02-18
Payer: MEDICARE

## 2025-02-18 ENCOUNTER — HOSPITAL ENCOUNTER (OUTPATIENT)
Dept: INFUSION THERAPY | Age: 85
Discharge: HOME | End: 2025-02-18
Payer: MEDICARE

## 2025-02-18 VITALS
TEMPERATURE: 98.6 F | DIASTOLIC BLOOD PRESSURE: 78 MMHG | HEIGHT: 66 IN | SYSTOLIC BLOOD PRESSURE: 116 MMHG | BODY MASS INDEX: 23.14 KG/M2 | OXYGEN SATURATION: 99 % | WEIGHT: 144 LBS | HEART RATE: 66 BPM

## 2025-02-18 DIAGNOSIS — C50.112 MALIGNANT NEOPLASM OF CENTRAL PORTION OF LEFT BREAST IN FEMALE, ESTROGEN RECEPTOR POSITIVE (HCC): ICD-10-CM

## 2025-02-18 DIAGNOSIS — Z51.81 ENCOUNTER FOR MONITORING TAMOXIFEN THERAPY: ICD-10-CM

## 2025-02-18 DIAGNOSIS — C50.112 MALIGNANT NEOPLASM OF CENTRAL PORTION OF LEFT BREAST IN FEMALE, ESTROGEN RECEPTOR POSITIVE (HCC): Primary | ICD-10-CM

## 2025-02-18 DIAGNOSIS — E04.1 THYROID NODULE: ICD-10-CM

## 2025-02-18 DIAGNOSIS — Z17.0 MALIGNANT NEOPLASM OF CENTRAL PORTION OF LEFT BREAST IN FEMALE, ESTROGEN RECEPTOR POSITIVE (HCC): ICD-10-CM

## 2025-02-18 DIAGNOSIS — Z78.0 POSTMENOPAUSAL STATE: ICD-10-CM

## 2025-02-18 DIAGNOSIS — Z79.810 ENCOUNTER FOR MONITORING TAMOXIFEN THERAPY: ICD-10-CM

## 2025-02-18 DIAGNOSIS — Z17.0 MALIGNANT NEOPLASM OF CENTRAL PORTION OF LEFT BREAST IN FEMALE, ESTROGEN RECEPTOR POSITIVE (HCC): Primary | ICD-10-CM

## 2025-02-18 LAB
ALBUMIN SERPL-MCNC: 4.4 G/DL (ref 3.5–5.2)
ALP SERPL-CCNC: 57 U/L (ref 35–104)
ALT SERPL-CCNC: 16 U/L (ref 5–33)
ANION GAP SERPL CALCULATED.3IONS-SCNC: 11 MMOL/L (ref 7–19)
AST SERPL-CCNC: 30 U/L (ref 5–32)
BASOPHILS # BLD: 0.03 K/UL (ref 0–0.2)
BASOPHILS NFR BLD: 0.5 % (ref 0–1)
BILIRUB SERPL-MCNC: 0.6 MG/DL (ref 0–1.2)
BUN SERPL-MCNC: 21 MG/DL (ref 8–23)
CALCIUM SERPL-MCNC: 9.4 MG/DL (ref 8.8–10.2)
CHLORIDE SERPL-SCNC: 104 MMOL/L (ref 98–107)
CO2 SERPL-SCNC: 26 MMOL/L (ref 22–29)
CREAT SERPL-MCNC: 0.8 MG/DL (ref 0.5–0.9)
EOSINOPHIL # BLD: 0.11 K/UL (ref 0–0.6)
EOSINOPHIL NFR BLD: 1.7 % (ref 0–5)
ERYTHROCYTE [DISTWIDTH] IN BLOOD BY AUTOMATED COUNT: 12.8 % (ref 11.5–14.5)
GLUCOSE SERPL-MCNC: 85 MG/DL (ref 70–99)
HCT VFR BLD AUTO: 38.4 % (ref 37–47)
HGB BLD-MCNC: 13.3 G/DL (ref 12–16)
LYMPHOCYTES # BLD: 2.3 K/UL (ref 1.1–4.5)
LYMPHOCYTES NFR BLD: 35.5 % (ref 20–40)
MCH RBC QN AUTO: 29.2 PG (ref 27–31)
MCHC RBC AUTO-ENTMCNC: 34.6 G/DL (ref 33–37)
MCV RBC AUTO: 84.2 FL (ref 81–99)
MONOCYTES # BLD: 0.42 K/UL (ref 0–0.9)
MONOCYTES NFR BLD: 6.5 % (ref 1–10)
NEUTROPHILS # BLD: 3.6 K/UL (ref 1.5–7.5)
NEUTS SEG NFR BLD: 55.6 % (ref 50–65)
PLATELET # BLD AUTO: 160 K/UL (ref 130–400)
PMV BLD AUTO: 11.4 FL (ref 9.4–12.3)
POTASSIUM SERPL-SCNC: 3.9 MMOL/L (ref 3.5–5.1)
PROT SERPL-MCNC: 6.8 G/DL (ref 6.4–8.3)
RBC # BLD AUTO: 4.56 M/UL (ref 4.2–5.4)
SODIUM SERPL-SCNC: 141 MMOL/L (ref 136–145)
WBC # BLD AUTO: 6.47 K/UL (ref 4.8–10.8)

## 2025-02-18 PROCEDURE — 99212 OFFICE O/P EST SF 10 MIN: CPT

## 2025-02-18 PROCEDURE — 36415 COLL VENOUS BLD VENIPUNCTURE: CPT

## 2025-02-18 PROCEDURE — 80053 COMPREHEN METABOLIC PANEL: CPT

## 2025-02-18 PROCEDURE — 85025 COMPLETE CBC W/AUTO DIFF WBC: CPT

## 2025-02-18 NOTE — PROGRESS NOTES
Progress Note      Pt Name: Ya Evans  YOB: 1940  MRN: [id number]    Date of evaluation: 02/18/2025  History Obtained From:  patient, electronic medical record    CHIEF COMPLAINT:    Chief Complaint   Patient presents with    Follow-up     Postmenopausal state     HISTORY OF PRESENT ILLNESS:    Ya Evans is a 84 y.o.  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  Current recommendation is for tamoxifen 10 mg p.o. daily for 10 years, anticipate continuing through June 2028 (she has a history of a CVA so dose reduction is appropriate). Ya returns today in follow-up for evaluation, side effect monitoring, lab monitoring and further treatment recommendations.      History of Present Illness  She has been on a regimen of tamoxifen since June 2018, which she reports as causing sleep disturbances. She does not experience hot flashes but acknowledges hair thinning. She does not perform self-breast examinations and reports persistent tenderness in her breast since her surgery. She reports no lymphedema.    She also experiences vaginal dryness, necessitating the use of a non-prescription cream recommended by her gynecologist. She is uncertain about the estrogen content of the cream. She is contemplating marriage and expresses concern about potential difficulties with intercourse due to the vaginal dryness. She recalls experiencing significant vaginal dryness during menopause, but this symptom had not recurred until after her 's death. She does not experience severe vaginal dryness during physical activities such as walking. She has a pessary in place for bladder support, which is removed and replaced every 2 months.    She had an ultrasound of her thyroid on 01/22/2025, which was normal. Dr. Chun Coronel is managing her thyroid cancer and has

## 2025-02-22 ASSESSMENT — ENCOUNTER SYMPTOMS
BACK PAIN: 0
COUGH: 0
EYES NEGATIVE: 1
SHORTNESS OF BREATH: 0
ABDOMINAL PAIN: 0
NAUSEA: 0
SORE THROAT: 0
EYE DISCHARGE: 0
VOMITING: 0
EYE REDNESS: 0
BLOOD IN STOOL: 0
WHEEZING: 0
RESPIRATORY NEGATIVE: 1
DIARRHEA: 0
CONSTIPATION: 0
EYE PAIN: 0
GASTROINTESTINAL NEGATIVE: 1

## 2025-03-04 ENCOUNTER — TELEPHONE (OUTPATIENT)
Dept: SURGERY | Age: 85
End: 2025-03-04

## 2025-03-04 NOTE — TELEPHONE ENCOUNTER
Called and spoke with patient and informed her of her mammo screening date of 4/3 @ 10:50 and follow up with Dontae on 4/16 at 11:15. Patient voiced understanding and dates were okay with her.

## 2025-03-07 ENCOUNTER — TELEPHONE (OUTPATIENT)
Dept: NEUROSURGERY | Facility: CLINIC | Age: 85
End: 2025-03-07
Payer: MEDICARE

## 2025-03-26 ENCOUNTER — OFFICE VISIT (OUTPATIENT)
Dept: NEUROSURGERY | Facility: CLINIC | Age: 85
End: 2025-03-26
Payer: MEDICARE

## 2025-03-26 VITALS — WEIGHT: 142 LBS | HEIGHT: 66 IN | BODY MASS INDEX: 22.82 KG/M2

## 2025-03-26 DIAGNOSIS — M54.12 CERVICAL RADICULOPATHY: ICD-10-CM

## 2025-03-26 DIAGNOSIS — M50.30 DEGENERATION OF CERVICAL INTERVERTEBRAL DISC: Primary | ICD-10-CM

## 2025-03-26 PROCEDURE — 99212 OFFICE O/P EST SF 10 MIN: CPT | Performed by: NURSE PRACTITIONER

## 2025-03-26 PROCEDURE — 1160F RVW MEDS BY RX/DR IN RCRD: CPT | Performed by: NURSE PRACTITIONER

## 2025-03-26 PROCEDURE — 1159F MED LIST DOCD IN RCRD: CPT | Performed by: NURSE PRACTITIONER

## 2025-03-26 RX ORDER — METHYLPREDNISOLONE 4 MG/1
TABLET ORAL
COMMUNITY
Start: 2025-03-25

## 2025-03-26 RX ORDER — AZITHROMYCIN 250 MG/1
TABLET, FILM COATED ORAL
COMMUNITY
Start: 2025-03-25

## 2025-03-26 NOTE — PROGRESS NOTES
"    Chief complaint:   Chief Complaint   Patient presents with    Neck Pain     Pt is here for f/u/e neck pain. Pt states since her last office visit her symptoms have improved.         Subjective     HPI: This is a 84-year-old female patient who we have seen in the past for neck pain.  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  Patient has been working through conservative treatment with physical therapy as well as trying treatments with pain management with mixed results.  She has also been using a compounded cream along with NSAIDs and muscle relaxers.  She was last seen in December 2024 and at that time felt like her symptoms were stable with the current treatment.  She comes in today for routine follow-up.  She comes in today and says that she is continuing to take her medication including gabapentin and does like this is keeping her symptoms overall under fairly good control.  She does have flareups where it does seem to be worse.  She says she is considering trying the injections again to see if they will offer her any more improvement.    Review of Systems   Musculoskeletal:  Positive for myalgias and neck pain.         Objective      Vital Signs  Ht 167.6 cm (66\")   Wt 64.4 kg (142 lb)   BMI 22.92 kg/m²     Physical Exam  Constitutional:       General: She is awake.      Appearance: She is well-developed.   HENT:      Head: Normocephalic.   Eyes:      Extraocular Movements: Extraocular movements intact.      Pupils: Pupils are equal, round, and reactive to light.   Pulmonary:      Effort: Pulmonary effort is normal.   Musculoskeletal:         General: Normal range of motion.      Cervical back: Normal range of motion.   Skin:     General: Skin is warm.   Neurological:      Mental Status: She is alert and oriented to person, place, and time.      GCS: GCS eye subscore is 4. GCS verbal subscore is 5. GCS motor subscore is 6.      Cranial Nerves: No cranial nerve deficit.      " Sensory: No sensory deficit.      Motor: Motor strength is normal.     Gait: Gait normal.      Deep Tendon Reflexes: Reflexes are normal and symmetric.   Psychiatric:         Speech: Speech normal.         Behavior: Behavior normal.         Thought Content: Thought content normal.         Neurological Exam  Mental Status  Awake and alert. Oriented to person, place and time. Oriented to person, place, and time. Speech is normal. Language is fluent with no aphasia. Attention and concentration are normal.    Cranial Nerves  CN I: Sense of smell is normal.  CN II: Right normal visual field. Left normal visual field.  CN III, IV, VI: Extraocular movements intact bilaterally. Pupils equal round and reactive to light bilaterally.  CN V: Facial sensation is normal.  CN VII:  Right: There is no facial weakness.  Left: There is no facial weakness.  CN XI: Shoulder shrug strength is normal.  CN XII: Tongue midline without atrophy or fasciculations.    Motor  Normal muscle bulk throughout. Normal muscle tone. Strength is 5/5 throughout all four extremities.    Sensory  Sensation is intact to light touch, pinprick, vibration and proprioception in all four extremities.    Reflexes  Deep tendon reflexes are 2+ and symmetric in all four extremities.    Gait  Normal casual, toe, heel and tandem gait. Normal gait.      Imaging review: No new imaging        Assessment/Plan: Patient is continue to complain of neck pain.  She does have known disc degeneration.  We are going to let her continue with her current treatments.  She is going to look back into the pain management injection option.  We will plan to see her back in 3 months for reevaluation.  She was told to call us if any further problems or concerns.    Patient is a nonsmoker  The patient's Body mass index is 22.92 kg/m².. BMI is within normal parameters. No follow-up required.  Advance Care Planning   ACP discussion was held with the patient during this visit. Patient does  not have an advance directive, information provided.   NEGRA Fall Risk Assessment was completed, and patient is at LOW risk for falls.Assessment completed on:1/22/2025     Diagnoses and all orders for this visit:    1. Degeneration of cervical intervertebral disc (Primary)    2. Cervical radiculopathy        I discussed the patients findings and my recommendations with patient  Marcell Shah, APRN  03/26/25  11:02 CDT

## 2025-03-26 NOTE — PATIENT INSTRUCTIONS
Advance Care Planning and Advance Directives     You make decisions on a daily basis - decisions about where you want to live, your career, your home, your life. Perhaps one of the most important decisions you face is your choice for future medical care. Take time to talk with your family and your healthcare team and start planning today.  Advance Care Planning is a process that can help you:  Understand possible future healthcare decisions in light of your own experiences  Reflect on those decision in light of your goals and values  Discuss your decisions with those closest to you and the healthcare professionals that care for you  Make a plan by creating a document that reflects your wishes    Surrogate Decision Maker  In the event of a medical emergency, which has left you unable to communicate or to make your own decisions, you would need someone to make decisions for you.  It is important to discuss your preferences for medical treatment with this person while you are in good health.     Qualities of a surrogate decision maker:  Willing to take on this role and responsibility  Knows what you want for future medical care  Willing to follow your wishes even if they don't agree with them  Able to make difficult medical decisions under stressful circumstances    Advance Directives  These are legal documents you can create that will guide your healthcare team and decision maker(s) when needed. These documents can be stored in the electronic medical record.    Living Will - a legal document to guide your care if you have a terminal condition or a serious illness and are unable to communicate. States vary by statute in document names/types, but most forms may include one or more of the following:        -  Directions regarding life-prolonging treatments        -  Directions regarding artificially provided nutrition/hydration        -  Choosing a healthcare decision maker        -  Direction regarding organ/tissue  donation    Durable Power of  for Healthcare - this document names an -in-fact to make medical decisions for you, but it may also allow this person to make personal and financial decisions for you. Please seek the advice of an  if you need this type of document.    **Advance Directives are not required and no one may discriminate against you if you do not sign one.    Medical Orders  Many states allow specific forms/orders signed by your physician to record your wishes for medical treatment in your current state of health. This form, signed in personal communication with your physician, addresses resuscitation and other medical interventions that you may or may not want.      For more information or to schedule a time with a [facility] Advance Care Planning Facilitator contact: [email or web address] or call [phone] and someone will contact you directly.

## 2025-04-03 ENCOUNTER — HOSPITAL ENCOUNTER (OUTPATIENT)
Dept: WOMENS IMAGING | Age: 85
Discharge: HOME | End: 2025-04-03
Payer: MEDICARE

## 2025-04-03 VITALS — WEIGHT: 139 LBS | BODY MASS INDEX: 22.44 KG/M2

## 2025-04-03 DIAGNOSIS — Z12.31 VISIT FOR SCREENING MAMMOGRAM: ICD-10-CM

## 2025-04-03 PROCEDURE — 77063 BREAST TOMOSYNTHESIS BI: CPT

## 2025-04-16 ENCOUNTER — OFFICE VISIT (OUTPATIENT)
Dept: SURGERY | Age: 85
End: 2025-04-16
Payer: MEDICARE

## 2025-04-16 VITALS — WEIGHT: 142 LBS | BODY MASS INDEX: 22.82 KG/M2 | HEIGHT: 66 IN | OXYGEN SATURATION: 97 % | HEART RATE: 62 BPM

## 2025-04-16 DIAGNOSIS — Z12.31 VISIT FOR SCREENING MAMMOGRAM: Primary | ICD-10-CM

## 2025-04-16 DIAGNOSIS — Z85.3 PERSONAL HISTORY OF BREAST CANCER: ICD-10-CM

## 2025-04-16 PROCEDURE — G8420 CALC BMI NORM PARAMETERS: HCPCS | Performed by: PHYSICIAN ASSISTANT

## 2025-04-16 PROCEDURE — 99213 OFFICE O/P EST LOW 20 MIN: CPT | Performed by: PHYSICIAN ASSISTANT

## 2025-04-16 PROCEDURE — 1159F MED LIST DOCD IN RCRD: CPT | Performed by: PHYSICIAN ASSISTANT

## 2025-04-16 PROCEDURE — 1036F TOBACCO NON-USER: CPT | Performed by: PHYSICIAN ASSISTANT

## 2025-04-16 PROCEDURE — G8399 PT W/DXA RESULTS DOCUMENT: HCPCS | Performed by: PHYSICIAN ASSISTANT

## 2025-04-16 PROCEDURE — G8427 DOCREV CUR MEDS BY ELIG CLIN: HCPCS | Performed by: PHYSICIAN ASSISTANT

## 2025-04-16 PROCEDURE — 1123F ACP DISCUSS/DSCN MKR DOCD: CPT | Performed by: PHYSICIAN ASSISTANT

## 2025-04-16 PROCEDURE — 1090F PRES/ABSN URINE INCON ASSESS: CPT | Performed by: PHYSICIAN ASSISTANT

## 2025-04-16 RX ORDER — AMLODIPINE BESYLATE 5 MG/1
1 TABLET ORAL DAILY
COMMUNITY
Start: 2025-01-04

## 2025-04-16 NOTE — PROGRESS NOTES
There are scattered areas of fibroglandular density.  There has been no significant interval change. There is no suspicious masses, no suspicious calcifications, or architectural distortion in either breast.  There are benign breast   calcifications most pronounced on the right. There are post surgical changes and clips in the upper outer left breast.     IMPRESSION:  BI-RADS 2, Benign.        RECOMMENDATION: Annual Screening Mammogram (1yr)             ______________________________________   Electronically signed by: ANTHONY CASTRO M.D.  Date:     04/03/2025  Time:    11:39   Performing Facility: [place] [address] Phone: [phone]    Physical Exam  Pulse 62, height 1.676 m (5' 6\"), weight 64.4 kg (142 lb), SpO2 97%.     Constitutional:  This is a 84 y.o.female that appears to be in no acute distress.  She is pleasant and answers questions appropriately.    Breast:  On examination to her breast, patient has no dominant palpable masses.  She has fibrocystic changes throughout both breast and post surgical changes to the left breast. There is no appreciable skin dimpling.  There is no axillary adenopathy or supraclavicular adenopathy appreciable.      Impression  Left breast cancer, no evidence of recurrence  Benign fibrocystic changes    Plan  We will see her back next year for yearly exam and mammography        20 minutes was spent during this exam with face-to-face counseling, review of data and physical exam   impaired balance/pain/decreased strength

## 2025-06-25 ENCOUNTER — TELEPHONE (OUTPATIENT)
Dept: NEUROSURGERY | Facility: CLINIC | Age: 85
End: 2025-06-25
Payer: MEDICARE

## 2025-06-26 ENCOUNTER — OFFICE VISIT (OUTPATIENT)
Dept: NEUROSURGERY | Facility: CLINIC | Age: 85
End: 2025-06-26
Payer: MEDICARE

## 2025-06-26 VITALS — BODY MASS INDEX: 22.82 KG/M2 | WEIGHT: 142 LBS | HEIGHT: 66 IN

## 2025-06-26 DIAGNOSIS — M54.12 CERVICAL RADICULOPATHY: ICD-10-CM

## 2025-06-26 DIAGNOSIS — M50.30 DEGENERATION OF CERVICAL INTERVERTEBRAL DISC: ICD-10-CM

## 2025-06-26 PROCEDURE — 1160F RVW MEDS BY RX/DR IN RCRD: CPT | Performed by: NURSE PRACTITIONER

## 2025-06-26 PROCEDURE — 1159F MED LIST DOCD IN RCRD: CPT | Performed by: NURSE PRACTITIONER

## 2025-06-26 PROCEDURE — 99213 OFFICE O/P EST LOW 20 MIN: CPT | Performed by: NURSE PRACTITIONER

## 2025-06-26 RX ORDER — GABAPENTIN 300 MG/1
300 CAPSULE ORAL 2 TIMES DAILY
COMMUNITY
Start: 2025-05-21

## 2025-06-26 RX ORDER — MEMANTINE HYDROCHLORIDE 5 MG/1
5 TABLET ORAL 2 TIMES DAILY
COMMUNITY
Start: 2025-05-21

## 2025-06-26 RX ORDER — POTASSIUM CHLORIDE 750 MG/1
TABLET, EXTENDED RELEASE ORAL
COMMUNITY

## 2025-06-26 NOTE — PROGRESS NOTES
"    Chief complaint:   Chief Complaint   Patient presents with    Neck Pain     Pt is here for 3mo f/u. Pt states since her last office visit her symptoms have improved.         Subjective     HPI: This is a 84-year-old female patient who we have seen in the past for neck pain. 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 Patient has been working through conservative treatment with physical therapy as well as trying treatments with pain management with mixed results. She has also been using a compounded cream along with NSAIDs and muscle relaxers. She was last seen in December 2024 and at that time felt like her symptoms were stable with the current treatment.  The patient was last seen on May 26, 2025 and felt like things were under good control at that time.  She comes in today for routine follow-up. She comes in today and says that she is continuing to take her diclofenac and  gabapentin and does like this is keeping her symptoms overall under fairly good control. She does have flareups where it does seem to be worse but overall it is under good control.    Review of Systems   Musculoskeletal:  Positive for arthralgias, myalgias and neck pain.         Objective      Vital Signs  Ht 167.6 cm (66\")   Wt 64.4 kg (142 lb)   BMI 22.92 kg/m²     Physical Exam  Constitutional:       General: She is awake.      Appearance: She is well-developed.   HENT:      Head: Normocephalic.   Eyes:      Extraocular Movements: Extraocular movements intact.      Pupils: Pupils are equal, round, and reactive to light.   Pulmonary:      Effort: Pulmonary effort is normal.   Musculoskeletal:         General: Normal range of motion.      Cervical back: Normal range of motion.   Skin:     General: Skin is warm.   Neurological:      Mental Status: She is alert and oriented to person, place, and time.      GCS: GCS eye subscore is 4. GCS verbal subscore is 5. GCS motor subscore is 6.      Cranial Nerves: No cranial " nerve deficit.      Sensory: No sensory deficit.      Motor: Motor strength is normal.     Gait: Gait normal.      Deep Tendon Reflexes: Reflexes are normal and symmetric.   Psychiatric:         Speech: Speech normal.         Behavior: Behavior normal.         Thought Content: Thought content normal.         Neurological Exam  Mental Status  Awake and alert. Oriented to person, place and time. Oriented to person, place, and time. Speech is normal. Language is fluent with no aphasia. Attention and concentration are normal.    Cranial Nerves  CN I: Sense of smell is normal.  CN II: Right normal visual field. Left normal visual field.  CN III, IV, VI: Extraocular movements intact bilaterally. Pupils equal round and reactive to light bilaterally.  CN V: Facial sensation is normal.  CN VII:  Right: There is no facial weakness.  Left: There is no facial weakness.  CN XI: Shoulder shrug strength is normal.  CN XII: Tongue midline without atrophy or fasciculations.    Motor  Normal muscle bulk throughout. Normal muscle tone. Strength is 5/5 throughout all four extremities.    Sensory  Sensation is intact to light touch, pinprick, vibration and proprioception in all four extremities.    Reflexes  Deep tendon reflexes are 2+ and symmetric in all four extremities.    Gait  Normal casual, toe, heel and tandem gait. Normal gait.      Imaging review: no new imaging        Assessment/Plan: Patient is doing well and feels her neck pain is under good control at this time.  We will refill her diclofenac.  Will need to ensure that she has renal function checked prior to refilling it again.  I will see her back in 3 months for reevaluation.  She was told to call us if any further problems or concerns.    Patient is a nonsmoker  The patient's Body mass index is 22.92 kg/m².. BMI is within normal parameters. No follow-up required.  Advance Care Planning   ACP discussion was held with the patient during this visit. Patient does not have  an advance directive, information provided.   [facility] Fall Risk Assessment was completed, and patient is at LOW risk for falls.Assessment completed on:1/22/2025     Diagnoses and all orders for this visit:    1. Cervical radiculopathy  -     diclofenac (VOLTAREN) 50 MG EC tablet; Take 1 tablet by mouth 2 (Two) Times a Day As Needed (Neck pain). for pain  Dispense: 60 tablet; Refill: 2    2. Degeneration of cervical intervertebral disc  -     diclofenac (VOLTAREN) 50 MG EC tablet; Take 1 tablet by mouth 2 (Two) Times a Day As Needed (Neck pain). for pain  Dispense: 60 tablet; Refill: 2        I discussed the patients findings and my recommendations with patient  Marcell Shah, APRN  06/26/25  11:14 CDT

## 2025-06-26 NOTE — PATIENT INSTRUCTIONS
Advance Care Planning and Advance Directives     You make decisions on a daily basis - decisions about where you want to live, your career, your home, your life. Perhaps one of the most important decisions you face is your choice for future medical care. Take time to talk with your family and your healthcare team and start planning today.  Advance Care Planning is a process that can help you:  Understand possible future healthcare decisions in light of your own experiences  Reflect on those decision in light of your goals and values  Discuss your decisions with those closest to you and the healthcare professionals that care for you  Make a plan by creating a document that reflects your wishes    Surrogate Decision Maker  In the event of a medical emergency, which has left you unable to communicate or to make your own decisions, you would need someone to make decisions for you.  It is important to discuss your preferences for medical treatment with this person while you are in good health.     Qualities of a surrogate decision maker:  Willing to take on this role and responsibility  Knows what you want for future medical care  Willing to follow your wishes even if they don't agree with them  Able to make difficult medical decisions under stressful circumstances    Advance Directives  These are legal documents you can create that will guide your healthcare team and decision maker(s) when needed. These documents can be stored in the electronic medical record.    Living Will - a legal document to guide your care if you have a terminal condition or a serious illness and are unable to communicate. States vary by statute in document names/types, but most forms may include one or more of the following:        -  Directions regarding life-prolonging treatments        -  Directions regarding artificially provided nutrition/hydration        -  Choosing a healthcare decision maker        -  Direction regarding organ/tissue  donation    Durable Power of  for Healthcare - this document names an -in-fact to make medical decisions for you, but it may also allow this person to make personal and financial decisions for you. Please seek the advice of an  if you need this type of document.    **Advance Directives are not required and no one may discriminate against you if you do not sign one.    Medical Orders  Many states allow specific forms/orders signed by your physician to record your wishes for medical treatment in your current state of health. This form, signed in personal communication with your physician, addresses resuscitation and other medical interventions that you may or may not want.      For more information or to schedule a time with a [facility] Advance Care Planning Facilitator contact: [email or web address] or call [phone] and someone will contact you directly.

## 2025-08-04 ENCOUNTER — TELEPHONE (OUTPATIENT)
Dept: NEUROSURGERY | Facility: CLINIC | Age: 85
End: 2025-08-04
Payer: MEDICARE

## 2025-08-14 ENCOUNTER — TELEPHONE (OUTPATIENT)
Dept: HEMATOLOGY | Age: 85
End: 2025-08-14

## 2025-08-18 DIAGNOSIS — Z17.0 MALIGNANT NEOPLASM OF CENTRAL PORTION OF LEFT BREAST IN FEMALE, ESTROGEN RECEPTOR POSITIVE (HCC): Primary | ICD-10-CM

## 2025-08-18 DIAGNOSIS — C50.112 MALIGNANT NEOPLASM OF CENTRAL PORTION OF LEFT BREAST IN FEMALE, ESTROGEN RECEPTOR POSITIVE (HCC): Primary | ICD-10-CM

## 2025-08-19 ENCOUNTER — HOSPITAL ENCOUNTER (OUTPATIENT)
Dept: INFUSION THERAPY | Age: 85
Discharge: HOME | End: 2025-08-19
Payer: MEDICARE

## 2025-08-19 ENCOUNTER — OFFICE VISIT (OUTPATIENT)
Dept: HEMATOLOGY | Age: 85
End: 2025-08-19
Payer: MEDICARE

## 2025-08-19 VITALS
BODY MASS INDEX: 23.48 KG/M2 | HEART RATE: 53 BPM | WEIGHT: 146.1 LBS | OXYGEN SATURATION: 99 % | HEIGHT: 66 IN | TEMPERATURE: 97.7 F | SYSTOLIC BLOOD PRESSURE: 132 MMHG | DIASTOLIC BLOOD PRESSURE: 70 MMHG

## 2025-08-19 DIAGNOSIS — E04.1 THYROID NODULE: ICD-10-CM

## 2025-08-19 DIAGNOSIS — Z17.0 MALIGNANT NEOPLASM OF CENTRAL PORTION OF LEFT BREAST IN FEMALE, ESTROGEN RECEPTOR POSITIVE (HCC): ICD-10-CM

## 2025-08-19 DIAGNOSIS — C50.112 MALIGNANT NEOPLASM OF CENTRAL PORTION OF LEFT BREAST IN FEMALE, ESTROGEN RECEPTOR POSITIVE (HCC): Primary | ICD-10-CM

## 2025-08-19 DIAGNOSIS — Z17.0 MALIGNANT NEOPLASM OF CENTRAL PORTION OF LEFT BREAST IN FEMALE, ESTROGEN RECEPTOR POSITIVE (HCC): Primary | ICD-10-CM

## 2025-08-19 DIAGNOSIS — C50.112 MALIGNANT NEOPLASM OF CENTRAL PORTION OF LEFT BREAST IN FEMALE, ESTROGEN RECEPTOR POSITIVE (HCC): ICD-10-CM

## 2025-08-19 DIAGNOSIS — Z51.81 ENCOUNTER FOR MONITORING TAMOXIFEN THERAPY: ICD-10-CM

## 2025-08-19 DIAGNOSIS — Z79.810 ENCOUNTER FOR MONITORING TAMOXIFEN THERAPY: ICD-10-CM

## 2025-08-19 DIAGNOSIS — Z78.0 POSTMENOPAUSAL STATE: ICD-10-CM

## 2025-08-19 LAB
ALBUMIN SERPL-MCNC: 4.1 G/DL (ref 3.5–5.2)
ALP SERPL-CCNC: 55 U/L (ref 35–104)
ALT SERPL-CCNC: 12 U/L (ref 5–33)
ANION GAP SERPL CALCULATED.3IONS-SCNC: 11 MMOL/L (ref 7–19)
AST SERPL-CCNC: 27 U/L (ref 5–32)
BASOPHILS # BLD: 0.04 K/UL (ref 0–0.2)
BASOPHILS NFR BLD: 0.5 % (ref 0–1)
BILIRUB SERPL-MCNC: 0.7 MG/DL (ref 0–1.2)
BUN SERPL-MCNC: 13 MG/DL (ref 8–23)
CALCIUM SERPL-MCNC: 9.6 MG/DL (ref 8.8–10.2)
CHLORIDE SERPL-SCNC: 101 MMOL/L (ref 98–107)
CO2 SERPL-SCNC: 26 MMOL/L (ref 22–29)
CREAT SERPL-MCNC: 0.9 MG/DL (ref 0.5–0.9)
EOSINOPHIL # BLD: 0.16 K/UL (ref 0–0.6)
EOSINOPHIL NFR BLD: 2.1 % (ref 0–5)
ERYTHROCYTE [DISTWIDTH] IN BLOOD BY AUTOMATED COUNT: 12.9 % (ref 11.5–14.5)
GLUCOSE SERPL-MCNC: 101 MG/DL (ref 70–99)
HCT VFR BLD AUTO: 36.1 % (ref 37–47)
HGB BLD-MCNC: 12.4 G/DL (ref 12–16)
LYMPHOCYTES # BLD: 2.34 K/UL (ref 1.1–4.5)
LYMPHOCYTES NFR BLD: 30.9 % (ref 20–40)
MCH RBC QN AUTO: 29.1 PG (ref 27–31)
MCHC RBC AUTO-ENTMCNC: 34.3 G/DL (ref 33–37)
MCV RBC AUTO: 84.7 FL (ref 81–99)
MONOCYTES # BLD: 0.41 K/UL (ref 0–0.9)
MONOCYTES NFR BLD: 5.4 % (ref 1–10)
NEUTROPHILS # BLD: 4.62 K/UL (ref 1.5–7.5)
NEUTS SEG NFR BLD: 61 % (ref 50–65)
PLATELET # BLD AUTO: 164 K/UL (ref 130–400)
PMV BLD AUTO: 11.5 FL (ref 9.4–12.3)
POTASSIUM SERPL-SCNC: 4.1 MMOL/L (ref 3.5–5.1)
PROT SERPL-MCNC: 6.5 G/DL (ref 6.4–8.3)
RBC # BLD AUTO: 4.26 M/UL (ref 4.2–5.4)
SODIUM SERPL-SCNC: 138 MMOL/L (ref 136–145)
WBC # BLD AUTO: 7.58 K/UL (ref 4.8–10.8)

## 2025-08-19 PROCEDURE — 1159F MED LIST DOCD IN RCRD: CPT | Performed by: NURSE PRACTITIONER

## 2025-08-19 PROCEDURE — G2211 COMPLEX E/M VISIT ADD ON: HCPCS | Performed by: NURSE PRACTITIONER

## 2025-08-19 PROCEDURE — 36415 COLL VENOUS BLD VENIPUNCTURE: CPT

## 2025-08-19 PROCEDURE — 1090F PRES/ABSN URINE INCON ASSESS: CPT | Performed by: NURSE PRACTITIONER

## 2025-08-19 PROCEDURE — 80053 COMPREHEN METABOLIC PANEL: CPT

## 2025-08-19 PROCEDURE — 85025 COMPLETE CBC W/AUTO DIFF WBC: CPT

## 2025-08-19 PROCEDURE — 1123F ACP DISCUSS/DSCN MKR DOCD: CPT | Performed by: NURSE PRACTITIONER

## 2025-08-19 PROCEDURE — 99212 OFFICE O/P EST SF 10 MIN: CPT

## 2025-08-19 PROCEDURE — G8420 CALC BMI NORM PARAMETERS: HCPCS | Performed by: NURSE PRACTITIONER

## 2025-08-19 PROCEDURE — 1125F AMNT PAIN NOTED PAIN PRSNT: CPT | Performed by: NURSE PRACTITIONER

## 2025-08-19 PROCEDURE — 1036F TOBACCO NON-USER: CPT | Performed by: NURSE PRACTITIONER

## 2025-08-19 PROCEDURE — G8399 PT W/DXA RESULTS DOCUMENT: HCPCS | Performed by: NURSE PRACTITIONER

## 2025-08-19 PROCEDURE — 99214 OFFICE O/P EST MOD 30 MIN: CPT | Performed by: NURSE PRACTITIONER

## 2025-08-19 PROCEDURE — G8427 DOCREV CUR MEDS BY ELIG CLIN: HCPCS | Performed by: NURSE PRACTITIONER

## 2025-08-19 RX ORDER — NITROGLYCERIN 0.4 MG/1
0.4 TABLET SUBLINGUAL EVERY 5 MIN PRN
COMMUNITY
Start: 2025-01-08

## 2025-08-19 RX ORDER — RANOLAZINE 500 MG/1
500 TABLET, EXTENDED RELEASE ORAL 2 TIMES DAILY
COMMUNITY
Start: 2024-12-02

## 2025-08-19 RX ORDER — FAMOTIDINE 20 MG/1
20 TABLET, FILM COATED ORAL 2 TIMES DAILY
COMMUNITY

## 2025-08-19 RX ORDER — CYCLOBENZAPRINE HCL 5 MG
5 TABLET ORAL 3 TIMES DAILY PRN
COMMUNITY
Start: 2024-09-17

## 2025-08-19 RX ORDER — FUROSEMIDE 20 MG/1
20 TABLET ORAL PRN
COMMUNITY

## 2025-08-27 ASSESSMENT — ENCOUNTER SYMPTOMS
BACK PAIN: 0
EYE PAIN: 0
EYE REDNESS: 0
CONSTIPATION: 0
ABDOMINAL PAIN: 0
SORE THROAT: 0
VOMITING: 0
GASTROINTESTINAL NEGATIVE: 1
RESPIRATORY NEGATIVE: 1
WHEEZING: 0
NAUSEA: 0
BLOOD IN STOOL: 0
EYE DISCHARGE: 0
SHORTNESS OF BREATH: 0
COUGH: 0
EYES NEGATIVE: 1
DIARRHEA: 0

## (undated) DEVICE — DRESSING GRMCDL 6 12FR D1N CNTR HOLE 4MM ANTMCRBL PRTCTVE DI

## (undated) DEVICE — PEN: MARKING STD 100/CS: Brand: MEDICAL ACTION INDUSTRIES

## (undated) DEVICE — SUTURE PERMAHAND SZ 2-0 L18IN NONABSORBABLE BLK L26MM SH C012D

## (undated) DEVICE — SUTURE VCRL SZ 3-0 L36IN ABSRB UD L36MM CT-1 1/2 CIR J944H

## (undated) DEVICE — COVER US PRB W5XL96IN LTX W/ GEL

## (undated) DEVICE — PROBE DTECT MARGIN F/LUMPECTOMY

## (undated) DEVICE — SPECIMEN ORIENTATION CHARMS, SIX DISTINCTLY SHAPED STERILE 10MM CHARMS: Brand: MARGINMAP

## (undated) DEVICE — DRAIN JACKSON PRATT ROUND 15FR: Brand: CARDINAL HEALTH

## (undated) DEVICE — GLOVE SURG SZ 85 L12IN FNGR ORTHO 126MIL CRM LTX FREE

## (undated) DEVICE — MINOR CDS: Brand: MEDLINE INDUSTRIES, INC.

## (undated) DEVICE — BULB SYRINGE, IRRIGATION WITH PROTECTIVE CAP, 60 CC, INDIVIDUALLY WRAPPED: Brand: DOVER

## (undated) DEVICE — SUTURE MCRYL SZ 4-0 L18IN ABSRB UD L19MM PS-2 3/8 CIR PRIM Y496G

## (undated) DEVICE — ASTOUND STANDARD SURGICAL GOWN, XXL: Brand: CONVERTORS

## (undated) DEVICE — STANDARD HYPODERMIC NEEDLE,POLYPROPYLENE HUB: Brand: MONOJECT

## (undated) DEVICE — PACK,UNIVERSAL,NO GOWNS: Brand: MEDLINE

## (undated) DEVICE — GLOVE SURG SZ 75 CRM LTX FREE POLYISOPRENE POLYMER BEAD ANTI

## (undated) DEVICE — GAUZE,SPONGE,FLUFF,6"X6.75",STRL,10/TRAY: Brand: MEDLINE

## (undated) DEVICE — SUTURE VCRL SZ 2-0 L36IN ABSRB UD L36MM CT-1 1/2 CIR J945H

## (undated) DEVICE — CLIP INT M L GRN TI TRNSVRS GRV CHEVRON SHP W/ PRECIS TIP

## (undated) DEVICE — SUTURE ETHLN SZ 2-0 L30IN NONABSORBABLE BLK L36MM FSLX 3/8 1674H

## (undated) DEVICE — 3M™ IOBAN™ 2 ANTIMICROBIAL INCISE DRAPE 6650EZ: Brand: IOBAN™ 2

## (undated) DEVICE — JACKSON-PRATT 100CC BULB RESERVOIR: Brand: CARDINAL HEALTH

## (undated) DEVICE — SOLUTION IV IRRIG WATER 1000ML POUR BRL 2F7114

## (undated) DEVICE — 3 ML SYRINGE WITH HYPODERMIC SAFETY NEEDLE: Brand: MAGELLAN